# Patient Record
Sex: MALE | Race: WHITE | Employment: FULL TIME | URBAN - METROPOLITAN AREA
[De-identification: names, ages, dates, MRNs, and addresses within clinical notes are randomized per-mention and may not be internally consistent; named-entity substitution may affect disease eponyms.]

---

## 2021-11-11 ENCOUNTER — TRANSCRIBE ORDER (OUTPATIENT)
Dept: SCHEDULING | Age: 31
End: 2021-11-11

## 2021-11-11 DIAGNOSIS — S46.011A STRAIN OF TENDON OF RIGHT ROTATOR CUFF: Primary | ICD-10-CM

## 2021-11-12 ENCOUNTER — HOSPITAL ENCOUNTER (OUTPATIENT)
Dept: MRI IMAGING | Age: 31
Discharge: HOME | End: 2021-11-12
Attending: ORTHOPAEDIC SURGERY
Payer: OTHER MISCELLANEOUS

## 2021-11-12 DIAGNOSIS — S46.011A STRAIN OF TENDON OF RIGHT ROTATOR CUFF: ICD-10-CM

## 2021-11-12 PROCEDURE — 73221 MRI JOINT UPR EXTREM W/O DYE: CPT

## 2022-04-14 ENCOUNTER — VIRTUAL VISIT (OUTPATIENT)
Dept: INTERNAL MEDICINE CLINIC | Age: 32
End: 2022-04-14
Payer: COMMERCIAL

## 2022-04-14 DIAGNOSIS — K21.9 GASTROESOPHAGEAL REFLUX DISEASE WITHOUT ESOPHAGITIS: ICD-10-CM

## 2022-04-14 DIAGNOSIS — J30.89 NON-SEASONAL ALLERGIC RHINITIS, UNSPECIFIED TRIGGER: ICD-10-CM

## 2022-04-14 DIAGNOSIS — Z83.3 FAMILY HISTORY OF DIABETES MELLITUS: ICD-10-CM

## 2022-04-14 DIAGNOSIS — Z00.00 ENCOUNTER FOR MEDICAL EXAMINATION TO ESTABLISH CARE: Primary | ICD-10-CM

## 2022-04-14 DIAGNOSIS — E66.01 MORBID OBESITY (HCC): ICD-10-CM

## 2022-04-14 DIAGNOSIS — J45.20 MILD INTERMITTENT ASTHMA WITHOUT COMPLICATION: ICD-10-CM

## 2022-04-14 DIAGNOSIS — R53.82 CHRONIC FATIGUE: ICD-10-CM

## 2022-04-14 DIAGNOSIS — I67.1 CEREBRAL ANEURYSM: ICD-10-CM

## 2022-04-14 DIAGNOSIS — R06.83 SNORING: ICD-10-CM

## 2022-04-14 DIAGNOSIS — K44.9 HIATAL HERNIA: ICD-10-CM

## 2022-04-14 PROCEDURE — 99205 OFFICE O/P NEW HI 60 MIN: CPT | Performed by: PHYSICIAN ASSISTANT

## 2022-04-14 RX ORDER — PHENOL/SODIUM PHENOLATE
20 AEROSOL, SPRAY (ML) MUCOUS MEMBRANE DAILY
Qty: 90 TABLET | Refills: 3 | Status: SHIPPED | OUTPATIENT
Start: 2022-04-14

## 2022-04-14 RX ORDER — MONTELUKAST SODIUM 10 MG/1
10 TABLET ORAL DAILY
Qty: 30 TABLET | Refills: 5 | Status: SHIPPED | OUTPATIENT
Start: 2022-04-14 | End: 2022-10-31 | Stop reason: SDUPTHER

## 2022-04-14 RX ORDER — ALBUTEROL SULFATE 90 UG/1
1-2 AEROSOL, METERED RESPIRATORY (INHALATION)
COMMUNITY
Start: 2021-05-03 | End: 2022-04-14 | Stop reason: SDUPTHER

## 2022-04-14 RX ORDER — FEXOFENADINE HCL 60 MG
60 TABLET ORAL DAILY
Qty: 90 TABLET | Refills: 3 | Status: SHIPPED | OUTPATIENT
Start: 2022-04-14

## 2022-04-14 RX ORDER — METFORMIN HYDROCHLORIDE 500 MG/1
500 TABLET ORAL
Qty: 30 TABLET | Refills: 5 | Status: SHIPPED | OUTPATIENT
Start: 2022-04-14 | End: 2022-09-15

## 2022-04-14 RX ORDER — BISMUTH SUBSALICYLATE 262 MG
1 TABLET,CHEWABLE ORAL DAILY
COMMUNITY

## 2022-04-14 RX ORDER — ALBUTEROL SULFATE 90 UG/1
1-2 AEROSOL, METERED RESPIRATORY (INHALATION)
Qty: 18 G | Refills: 0 | Status: SHIPPED | OUTPATIENT
Start: 2022-04-14 | End: 2022-07-13

## 2022-04-14 RX ORDER — FEXOFENADINE HCL 60 MG
TABLET ORAL
COMMUNITY
End: 2022-04-14 | Stop reason: SDUPTHER

## 2022-04-14 RX ORDER — FEXOFENADINE HCL AND PSEUDOEPHEDRINE HCI 60; 120 MG/1; MG/1
1 TABLET, EXTENDED RELEASE ORAL EVERY 12 HOURS
COMMUNITY
End: 2022-04-14

## 2022-04-14 RX ORDER — FLUTICASONE PROPIONATE 50 MCG
2 SPRAY, SUSPENSION (ML) NASAL DAILY
COMMUNITY

## 2022-04-14 RX ORDER — PHENOL/SODIUM PHENOLATE
20 AEROSOL, SPRAY (ML) MUCOUS MEMBRANE DAILY
COMMUNITY
End: 2022-04-14 | Stop reason: SDUPTHER

## 2022-04-14 RX ORDER — TOPIRAMATE 25 MG/1
25 TABLET ORAL 2 TIMES DAILY
Qty: 60 TABLET | Refills: 5 | Status: SHIPPED | OUTPATIENT
Start: 2022-04-14 | End: 2022-04-29 | Stop reason: DRUGHIGH

## 2022-04-14 NOTE — PATIENT INSTRUCTIONS
Topiramate (By mouth)   Topiramate (toe-PIR-a-mate)  Treats and prevents seizures, and helps prevent migraine headaches. Brand Name(s): Qudexy XR, Topamax, Trokendi XR   There may be other brand names for this medicine. When This Medicine Should Not Be Used: This medicine is not right for everyone. Do not use it if you had an allergic reaction to topiramate, or if you are pregnant. How to Use This Medicine:   Capsule, Long Acting Capsule, Tablet  · Take your medicine as directed. Your dose may need to be changed several times to find what works best for you. · Tablet: Swallow whole. Do not break, crush, or chew the tablet. It has a very bitter taste. · Capsule or extended-release capsule: Do not crush or chew the capsule. Swallow whole or open the capsule and pour the medicine into a small amount (1 teaspoon) of soft food, such as applesauce. Swallow the mixture right away without chewing. Do not store the mixture for use at a later time. · Drink extra fluids so you will urinate more often and help prevent kidney problems. · This medicine should come with a Medication Guide. Ask your pharmacist for a copy if you do not have one. · Missed dose: Take a dose as soon as you remember. If it is almost time for your next dose, wait until then and take a regular dose. Do not take extra medicine to make up for a missed dose. If you miss a dose or forget to use your medicine, use it as soon as you can. If your next regular dose of Topamax® is less than 6 hours away, wait until then to use the medicine and skip the missed dose. If you miss more than 1 dose of Topamax®, call your doctor for instructions. · Store the medicine in a closed container at room temperature, away from heat, moisture, and direct light. Drugs and Foods to Avoid:   Ask your doctor or pharmacist before using any other medicine, including over-the-counter medicines, vitamins, and herbal products.   · Do not drink alcohol with Qudexy XR or Topamax®. Do not drink alcohol for 6 hours before and 6 hours after you take the Trokendi XR capsule. · Some medicines can affect how topiramate works. Tell your doctor if you are using acetazolamide, dichlorphenamide, dichloralphenazone, digoxin, lithium, metformin, zonisamide, other medicine for seizures (such as carbamazepine, phenytoin, valproic acid), or birth control pills. · Tell your doctor if you are using any medicine that makes you sleepy, such as allergy medicine or narcotic pain medicine. Warnings While Using This Medicine:   · It is not safe to take this medicine during pregnancy. It could harm an unborn baby. Tell your doctor right away if you become pregnant. · Tell your doctor if you are breastfeeding, or if you have kidney disease, liver disease, glaucoma, lung or breathing problems, osteoporosis, or a history of depression or mood disorders. Tell your doctor if you are on a ketogenic diet (high in fat and low in carbohydrates). · This medicine may cause the following problems:  ¨ Eye pain or vision changes, including glaucoma  ¨ Changes in body temperature  ¨ Metabolic acidosis (too much acid in the blood)  ¨ Kidney stones  · This medicine may increase depression or thoughts of suicide. Tell your doctor right away if you start to feel more depressed or think about hurting yourself. · This medicine may make you dizzy, drowsy, or tired. Do not drive or do anything else that could be dangerous until you know how this medicine affects you. · Do not stop using this medicine suddenly. Your doctor will need to slowly decrease your dose before you stop it completely. · Your doctor will do lab tests at regular visits to check on the effects of this medicine. Keep all appointments. · Keep all medicine out of the reach of children. Never share your medicine with anyone.   Possible Side Effects While Using This Medicine:   Call your doctor right away if you notice any of these side effects:  · Allergic reaction: Itching or hives, swelling in your face or hands, swelling or tingling in your mouth or throat, chest tightness, trouble breathing  · Bloody or cloudy urine, painful urination, sudden lower back or stomach pain  · Changes in vision, eye pain  · Confusion, problems with walking, clumsiness, dizziness, or trouble talking, concentrating, or remembering  · Feeling agitated, depressed, nervous, or irritable, thoughts of hurting yourself or others, unusual mood or behavior  · Fever, decreased sweating  · Numbness, tingling, or burning pain in your hands, arms, legs, or feet  · Rapid, deep breathing, loss of appetite, fast or uneven heartbeat  · Vomiting, unusual drowsiness, tiredness, or weakness  If you notice these less serious side effects, talk with your doctor:   · Change in taste  · Nausea, diarrhea  · Stuffy or runny nose  · Weight loss  If you notice other side effects that you think are caused by this medicine, tell your doctor. Call your doctor for medical advice about side effects. You may report side effects to FDA at 5-126-FDA-7022  © 2017 [facility] Information is for End User's use only and may not be sold, redistributed or otherwise used for commercial purposes. The above information is an  only. It is not intended as medical advice for individual conditions or treatments. Talk to your doctor, nurse or pharmacist before following any medical regimen to see if it is safe and effective for you. Metformin (By mouth)   Metformin Hydrochloride (met-FOR-min eugenie-droe-KLOR-milton)  Treats type 2 diabetes. Brand Name(s): DM2, Fortamet, Glucophage, Glucophage XR, Glumetza, Riomet   There may be other brand names for this medicine. When This Medicine Should Not Be Used: This medicine is not right for everyone. Do not use if you had an allergic reaction to metformin.   How to Use This Medicine:   Liquid, Tablet, Long Acting Tablet  · Take your medicine as directed. Your dose may need to be changed several times to find what works best for you. · It is best to take this medicine with food or milk. · Swallow the extended-release tablet whole. Do not crush, break, or chew it. Tell your doctor if you have trouble swallowing the tablets whole. · Measure the oral liquid medicine with a marked measuring spoon, oral syringe, or medicine cup. · Read and follow the patient instructions that come with this medicine. Talk to your doctor or pharmacist if you have any questions. · Missed dose: Take a dose as soon as you remember. If it is almost time for your next dose, wait until then and take a regular dose. Do not take extra medicine to make up for a missed dose. · Store the medicine in a closed container at room temperature, away from heat, moisture, and direct light. Drugs and Foods to Avoid:   Ask your doctor or pharmacist before using any other medicine, including over-the-counter medicines, vitamins, and herbal products. · Some medicines can affect how metformin works. Tell your doctor if you are using any of the following:  ¨ Acetazolamide, dichlorphenamide, dolutegravir, isoniazid, nicotinic acid, phenytoin, ranolazine, topiramate, vandetanib, zonisamide  ¨ Birth control pills  ¨ Blood pressure medicine  ¨ Diuretic (water pill)  ¨ Phenothiazine medicine  ¨ Steroid medicine  ¨ Thyroid medicine  · Do not drink alcohol while you are using this medicine. Warnings While Using This Medicine:   · Tell your doctor if you are pregnant or breastfeeding, or if you have kidney disease, liver disease, heart or blood vessel disease, heart failure, blood circulation problems, anemia, metabolic acidosis, an adrenal gland or pituitary gland disorder, vitamin B12 deficiency, or had a heart attack. Tell your doctor if you drink alcohol. · Too much of this medicine can cause a rare, but serious condition called lactic acidosis.   · Part of the extended-release tablet may pass in your stool. This is normal.  · Make sure any doctor or dentist who treats you knows that you are using this medicine. You may need to stop using this medicine before you have surgery, an x-ray, CT scan, or other medical test.  · Your doctor will do lab tests at regular visits to check on the effects of this medicine. Keep all appointments. · Keep all medicine out of the reach of children. Never share your medicine with anyone. Possible Side Effects While Using This Medicine:   Call your doctor right away if you notice any of these side effects:  · Allergic reaction: Itching or hives, swelling in your face or hands, swelling or tingling in your mouth or throat, chest tightness, trouble breathing  · Confusion, fast heartbeat, increased hunger, shakiness  · Fever or chills  · Stomach pain, nausea, vomiting, muscle pain or cramping  · Trouble breathing, slow heartbeat, lightheadedness, dizziness  · Unusual tiredness or weakness  If you notice these less serious side effects, talk with your doctor:   · Diarrhea, gas  If you notice other side effects that you think are caused by this medicine, tell your doctor. Call your doctor for medical advice about side effects. You may report side effects to FDA at 7-300-FDA-3223  © 2017 [facility] Information is for End User's use only and may not be sold, redistributed or otherwise used for commercial purposes. The above information is an  only. It is not intended as medical advice for individual conditions or treatments. Talk to your doctor, nurse or pharmacist before following any medical regimen to see if it is safe and effective for you. Montelukast (By mouth)   Montelukast (uoh-pw-OUQ-kast)  Prevents asthma attacks and treats allergies. Brand Name(s): Singulair   There may be other brand names for this medicine. When This Medicine Should Not Be Used: This medicine is not right for everyone.  Do not use it if you had an allergic reaction to montelukast.  How to Use This Medicine:   Packet, Tablet, Chewable Tablet  · Your doctor will tell you how much medicine to use. Do not use more than directed. · Read and follow the patient instructions that come with this medicine. Talk to your doctor or pharmacist if you have any questions. · Oral granules: Do not open the packet until you are ready to use it. You can give the oral granules in one of three ways. ¨ Put the oral granules directly on a spoon, then into the person's mouth. ¨ Mix the granules with baby formula or breast milk that is cold or room temperature. Do not mix the granules with any other liquid. ¨ Mix the granules with applesauce, mashed carrots, rice, or ice cream. Do not mix the granules with any other type of soft food. ¨ If the medicine is mixed with formula, breast milk, or food, use it right away. Do not save any mixed medicine to use later. · Missed dose: Take a dose as soon as you remember. If it is almost time for your next dose, wait until then and take a regular dose. Do not take extra medicine to make up for a missed dose. · Store the medicine in the original container at room temperature, away from heat and direct light. Drugs and Foods to Avoid:      Ask your doctor or pharmacist before using any other medicine, including over-the-counter medicines, vitamins, and herbal products. Warnings While Using This Medicine:   · Tell your doctor if you are pregnant or breastfeeding, or if you have liver disease, have a condition called phenylketonuria (PKU), or are allergic to aspirin. · This medicine will not stop an asthma attack that has already started. Your doctor may prescribe another medicine for a sudden asthma attack. · If you use a corticosteroid medicine to control your asthma, keep using it as instructed by your doctor. · If any of your asthma medicines do not seem to be working as well as usual, call your doctor right away.  Do not change your doses or stop using your medicines without asking your doctor. · This medicine may cause some people to be agitated, disoriented, irritable, or reckless. It may also cause depression or suicidal thoughts. Tell your doctor if you have any unusual thoughts or behaviors that trouble you. · This medicine may make you dizzy or drowsy. Do not drive or do anything else that could be dangerous until you know how this medicine affects you. · Keep all medicine out of the reach of children. Never share your medicine with anyone. Possible Side Effects While Using This Medicine:   Call your doctor right away if you notice any of these side effects:  · Allergic reaction: Itching or hives, swelling in your face or hands, swelling or tingling in your mouth or throat, chest tightness, trouble breathing  · Blistering, peeling, red skin rash  · Chest pain, or a fast, pounding, or uneven heartbeat  · Numbness or tingling in the hands, arms, legs, or feet  · Pain and swelling in your sinuses  · Restlessness, anxiety, irritability, mood or behavior changes, or thoughts of hurting yourself or others  · Sudden and severe stomach pain, nausea, vomiting, fever, lightheadedness  · Unusual bleeding or bruising  If you notice these less serious side effects, talk with your doctor:   · Cough, sore throat, or flu symptoms  · Headache  If you notice other side effects that you think are caused by this medicine, tell your doctor. Call your doctor for medical advice about side effects. You may report side effects to FDA at 3-485-FDA-2079  © 2017 [facility] Information is for End User's use only and may not be sold, redistributed or otherwise used for commercial purposes. The above information is an  only. It is not intended as medical advice for individual conditions or treatments. Talk to your doctor, nurse or pharmacist before following any medical regimen to see if it is safe and effective for you.

## 2022-04-14 NOTE — PROGRESS NOTES
Khloe Guerin is a 27 y.o. male who was seen by synchronous (real-time) audio-video technology on 4/14/2022 for Establish Care (previous pcp - none // pain - 0 ), Weight Loss, Cerebral Aneurysm (has one in 2017 // ), and Labs (family history of diabetes // transitioning and stopped in 2018 // testerone levels )        Assessment & Plan:   Diagnoses and all orders for this visit:    1. Encounter for medical examination to establish care  -     CBC WITH AUTOMATED DIFF; Future  -     METABOLIC PANEL, COMPREHENSIVE; Future  -     LIPID PANEL; Future  -     URINALYSIS W/MICROSCOPIC; Future  -     VITAMIN D, 25 HYDROXY; Future    2. Morbid obesity (HCC)  -     topiramate (TOPAMAX) 25 mg tablet; Take 1 Tablet by mouth two (2) times a day. -     metFORMIN (GLUCOPHAGE) 500 mg tablet; Take 1 Tablet by mouth daily (with dinner). -     HEMOGLOBIN A1C WITH EAG; Future  -     TSH 3RD GENERATION; Future  Will begin daily metofrmin and BID topamax for weight loss. Will discuss further in office    3. Cerebral aneurysm  -     REFERRAL TO NEUROLOGY  With bleed and sp coiling in 2017 in [place]. Needs neurosurgeon for yearly screening . Return to office to check blood pressure and discussed BP control  4. Non-seasonal allergic rhinitis, unspecified trigger  -     fexofenadine (ALLEGRA) 60 mg tablet; Take 1 Tablet by mouth daily. -     montelukast (SINGULAIR) 10 mg tablet; Take 1 Tablet by mouth daily.  -     REFERRAL TO ENT-OTOLARYNGOLOGY  Begin daily Singulair. Send to ENT for further pricila and possibly allergist   5. Chronic fatigue  -     TESTOSTERONE, FREE & TOTAL; Future  -     VITAMIN D, 25 HYDROXY; Future  Check T levels. Curious about T levels given hx of testosterone blocker use with transition  6. Snoring  -     SLEEP MEDICINE REFERRAL  -     REFERRAL TO ENT-OTOLARYNGOLOGY  Possibly related to fatigue. Send for sleep study  7. Family history of diabetes mellitus  Check CMP/A1C  8.  Gastroesophageal reflux disease without esophagitis  -     Omeprazole delayed release (PRILOSEC D/R) 20 mg tablet; Take 1 Tablet by mouth daily.  -     REFERRAL TO GASTROENTEROLOGY  See below  9. Hiatal hernia  -     Omeprazole delayed release (PRILOSEC D/R) 20 mg tablet; Take 1 Tablet by mouth daily.  -     REFERRAL TO GASTROENTEROLOGY  Pt reported, refill prilosec, send to GI  10. Mild intermittent asthma without complication  -     albuterol (PROVENTIL HFA, VENTOLIN HFA, PROAIR HFA) 90 mcg/actuation inhaler; Take 1-2 Puffs by inhalation every four (4) hours as needed for Wheezing for up to 90 days. -     montelukast (SINGULAIR) 10 mg tablet; Take 1 Tablet by mouth daily. Described some chest tightness related at times related to allergens and \"wine that had been barrel aged. 11. Orgasmic dysfunction  -     TESTOSTERONE, FREE & TOTAL; Future  -     PSA SCREENING (SCREENING); Future  Reports since testosterone blocker has had decreased orgasm. Still ejaculates but significant decrease in orgasm. The complexity of medical decision making for this visit is high     I spent at least 60 minutes on this visit with this new patient. 712  Subjective:   Patient seen via mychart virtual visit to establish care. PMHx:   Tobacco user: 1 ppd, has tried to quit. Causes stress and increased BP. Cerebral Aneurysm. In 2017, had aneurysm with bleeding while living in [place]. Had to be transported [facility] and had neurosurgery coil aneurysm and stop bleeding. Followed up few years later in [place] and had conficting reports whether new aneurysm was present. Had establishd with [facility] neurosurgery and had CTA done which was okay. Did not do well with Neurosurgeon at [facility]. Unsure of blood pressures at this time. Hx of GERD: Had EGD done in [place] showed Hiatal hernia. Uses 20 mg prilosec regularly. Had some epigastric pain recently, non-recurrence since. Hx of year round seasonal allergies.  Gets chronic sinusitis issues when bad. Has been using Allegra and Flonase. Has not seen ENT. Notes occasionally gets vertigo like dizziness when bad and has sinus pressure which causes pain in teeth and behind eyes. Hx of transition male to female. Was using female hormones and testosterone blockers. Decided to stop after aneurysm because of risk of blood clots with female hormones. Is okay with this decision at this time. No hx of mental health, depression or anxiety. Family hx of DM2, HTN. Prior to Admission medications    Medication Sig Start Date End Date Taking? Authorizing Provider   multivitamin (ONE A DAY) tablet Take 1 Tablet by mouth daily. Yes Provider, Historical   Lactobacillus acidophilus (PROBIOTIC PO) Take  by mouth. Yes Provider, Historical   fluticasone propionate (Flonase Allergy Relief) 50 mcg/actuation nasal spray 2 Sprays by Both Nostrils route daily. Yes Provider, Historical   albuterol (PROVENTIL HFA, VENTOLIN HFA, PROAIR HFA) 90 mcg/actuation inhaler Take 1-2 Puffs by inhalation every four (4) hours as needed for Wheezing for up to 90 days. 4/14/22 7/13/22 Yes Miryam Fisher PA-C   fexofenadine (ALLEGRA) 60 mg tablet Take 1 Tablet by mouth daily. 4/14/22  Yes Miryam Fisher PA-C   montelukast (SINGULAIR) 10 mg tablet Take 1 Tablet by mouth daily. 4/14/22  Yes Miryam Fisher PA-C   Omeprazole delayed release (PRILOSEC D/R) 20 mg tablet Take 1 Tablet by mouth daily. 4/14/22  Yes Miryam Fisher PA-C   topiramate (TOPAMAX) 25 mg tablet Take 1 Tablet by mouth two (2) times a day. 4/14/22  Yes Miryam Fisher PA-C   metFORMIN (GLUCOPHAGE) 500 mg tablet Take 1 Tablet by mouth daily (with dinner). 4/14/22  Yes Miryam Fisher PA-C   Omeprazole delayed release (PRILOSEC D/R) 20 mg tablet Take 20 mg by mouth daily. 4/14/22  Provider, Historical   fexofenadine-pseudoephedrine (Allegra-D 12 Hour)  mg Tb12 Take 1 Tablet by mouth every twelve (12) hours.   Patient not taking: Reported on 4/14/2022 4/14/22  Provider, Historical   albuterol (PROVENTIL HFA, VENTOLIN HFA, PROAIR HFA) 90 mcg/actuation inhaler Take 1-2 Puffs by inhalation. 5/3/21 4/14/22  Provider, Historical   fexofenadine (ALLEGRA) 60 mg tablet   4/14/22  Provider, Historical     Patient Active Problem List   Diagnosis Code    Non-seasonal allergic rhinitis J30.89    Cerebral aneurysm I67.1    Morbid obesity ([facility] [address] E66.01    Snoring R06.83    Family history of diabetes mellitus Z83.3    Orgasmic dysfunction F52.31    Mild intermittent asthma without complication K62.76    Hiatal hernia K44.9    Gastroesophageal reflux disease without esophagitis K21.9     Patient Active Problem List    Diagnosis Date Noted    Non-seasonal allergic rhinitis 04/14/2022    Cerebral aneurysm 04/14/2022    Morbid obesity ([facility] [address] 04/14/2022    Snoring 04/14/2022    Family history of diabetes mellitus 04/14/2022    Orgasmic dysfunction 04/14/2022    Mild intermittent asthma without complication 64/74/2361    Hiatal hernia 04/14/2022    Gastroesophageal reflux disease without esophagitis 04/14/2022     Current Outpatient Medications   Medication Sig Dispense Refill    multivitamin (ONE A DAY) tablet Take 1 Tablet by mouth daily.  Lactobacillus acidophilus (PROBIOTIC PO) Take  by mouth.  fluticasone propionate (Flonase Allergy Relief) 50 mcg/actuation nasal spray 2 Sprays by Both Nostrils route daily.  albuterol (PROVENTIL HFA, VENTOLIN HFA, PROAIR HFA) 90 mcg/actuation inhaler Take 1-2 Puffs by inhalation every four (4) hours as needed for Wheezing for up to 90 days. 18 g 0    fexofenadine (ALLEGRA) 60 mg tablet Take 1 Tablet by mouth daily. 90 Tablet 3    montelukast (SINGULAIR) 10 mg tablet Take 1 Tablet by mouth daily. 30 Tablet 5    Omeprazole delayed release (PRILOSEC D/R) 20 mg tablet Take 1 Tablet by mouth daily. 90 Tablet 3    topiramate (TOPAMAX) 25 mg tablet Take 1 Tablet by mouth two (2) times a day.  60 Tablet 5  metFORMIN (GLUCOPHAGE) 500 mg tablet Take 1 Tablet by mouth daily (with dinner). 30 Tablet 5     No Known Allergies  Past Medical History:   Diagnosis Date    Brain aneurysm 2017     Past Surgical History:   Procedure Laterality Date    HX OTHER SURGICAL  2017    surgery for brain aneurysm     Family History   Problem Relation Age of Onset    Diabetes Mother     Hypertension Mother    [facility] Elevated Lipids Mother      Social History     Tobacco Use    Smoking status: Current Every Day Smoker     Packs/day: 1.00     Types: Cigarettes    Smokeless tobacco: Never Used   Substance Use Topics    Alcohol use: Yes     Comment: occasional        Review of Systems   Constitutional: Positive for malaise/fatigue. Negative for chills, fever and weight loss. HENT: Negative for congestion and hearing loss. Eyes: Negative. Respiratory: Negative for cough, shortness of breath and wheezing. Cardiovascular: Negative for chest pain, palpitations and leg swelling. Gastrointestinal: Positive for heartburn. Negative for abdominal pain, blood in stool, constipation, diarrhea, melena, nausea and vomiting. Genitourinary: Negative for dysuria and frequency. Musculoskeletal: Negative for myalgias. Skin: Negative for rash. Neurological: Negative for dizziness, weakness and headaches. Endo/Heme/Allergies: Positive for environmental allergies. Does not bruise/bleed easily. Psychiatric/Behavioral: Negative for depression and substance abuse. The patient is not nervous/anxious. All other systems reviewed and are negative.       Objective:     Patient-Reported Vitals 4/14/2022   Patient-Reported Weight 300lb      General: alert, cooperative, no distress   Mental  status: normal mood, behavior, speech, dress, motor activity, and thought processes, able to follow commands   HENT: NCAT   Neck: no visualized mass   Resp: no respiratory distress   Neuro: no gross deficits   Skin: no discoloration or lesions of concern on visible areas   Psychiatric: normal affect, consistent with stated mood, no evidence of hallucinations     Additional exam findings: We discussed the expected course, resolution and complications of the diagnosis(es) in detail. Medication risks, benefits, costs, interactions, and alternatives were discussed as indicated. I advised him to contact the office if his condition worsens, changes or fails to improve as anticipated. He expressed understanding with the diagnosis(es) and plan. Osiris Guo, was evaluated through a synchronous (real-time) audio-video encounter. The patient (or guardian if applicable) is aware that this is a billable service, which includes applicable co-pays. Verbal consent to proceed has been obtained. The visit was conducted pursuant to the emergency declaration under the [address] [place] [address], [address] [place] [address] authority and the [employer] General Act. Patient identification was verified, and a caregiver was present when appropriate. The patient was located at home in a state where the provider was licensed to provide care.     Amaya Okeefe PA-C

## 2022-04-14 NOTE — PROGRESS NOTES
Cesar Done  Identified pt with two pt identifiers(name and ). Chief Complaint   Patient presents with   [address]     previous pcp - none // pain - 0     Weight Loss    Cerebral Aneurysm     has one in 2017 //    [place]     family history of diabetes // transitioning and stopped in 2018 // testerone levels        Reviewed record In preparation for visit and have obtained necessary documentation. 1. Have you been to the ER, urgent care clinic or hospitalized since your last visit? No    2. Have you seen or consulted any other health care providers outside of the [address] [place] [address] since your last visit? Include any pap smears or colon screening. Yes. Eye Exam - Phu Garcia in [facility]     Patient does not have an advance directive. Vitals reviewed with provider. Health Maintenance reviewed:     Health Maintenance Due   Topic    Hepatitis C Screening     Depression Screen     COVID-19 Vaccine (3 - Booster for Pfizer series)          Wt Readings from Last 3 Encounters:   No data found for Wt        Temp Readings from Last 3 Encounters:   No data found for Temp        BP Readings from Last 3 Encounters:   No data found for BP        Pulse Readings from Last 3 Encounters:   No data found for Pulse      There were no vitals filed for this visit. Learning Assessment:   :       Learning Assessment 2022   PRIMARY LEARNER Patient   HIGHEST LEVEL OF EDUCATION - PRIMARY LEARNER  GRADUATED HIGH SCHOOL OR GED   BARRIERS PRIMARY LEARNER NONE   PRIMARY LANGUAGE ENGLISH   LEARNER PREFERENCE PRIMARY DEMONSTRATION   ANSWERED BY Patient   RELATIONSHIP SELF        Depression Screening:   :       3 most recent PHQ Screens 2022   Little interest or pleasure in doing things Not at all   Feeling down, depressed, irritable, or hopeless Not at all   Total Score PHQ 2 0        Fall Risk Assessment:   :     No flowsheet data found. Abuse Screening:   :     No flowsheet data found. ADL Screening:   :       ADL Assessment 4/14/2022   Feeding yourself No Help Needed   Getting from bed to chair No Help Needed   Getting dressed No Help Needed   Bathing or showering No Help Needed   Walk across the room (includes cane/walker) No Help Needed   Using the telphone No Help Needed   Taking your medications No Help Needed   Preparing meals No Help Needed   Managing money (expenses/bills) No Help Needed   Moderately strenuous housework (laundry) No Help Needed   Shopping for personal items (toiletries/medicines) No Help Needed   Shopping for groceries No Help Needed   Driving No Help Needed   Climbing a flight of stairs No Help Needed   Getting to places beyond walking distances No Help Needed

## 2022-04-18 ENCOUNTER — VIRTUAL VISIT (OUTPATIENT)
Dept: INTERNAL MEDICINE CLINIC | Age: 32
End: 2022-04-18
Payer: COMMERCIAL

## 2022-04-18 DIAGNOSIS — R79.89 LOW TESTOSTERONE IN MALE: Primary | ICD-10-CM

## 2022-04-18 DIAGNOSIS — N30.01 ACUTE CYSTITIS WITH HEMATURIA: Primary | ICD-10-CM

## 2022-04-18 DIAGNOSIS — R35.0 URINARY FREQUENCY: ICD-10-CM

## 2022-04-18 LAB
BILIRUB UR QL STRIP: NEGATIVE
GLUCOSE UR-MCNC: NEGATIVE MG/DL
KETONES P FAST UR STRIP-MCNC: NEGATIVE MG/DL
PH UR STRIP: 7 [PH] (ref 4.6–8)
PROT UR QL STRIP: NORMAL
SP GR UR STRIP: 1.02 (ref 1–1.03)
UA UROBILINOGEN AMB POC: NORMAL (ref 0.2–1)
URINALYSIS CLARITY POC: NORMAL
URINALYSIS COLOR POC: NORMAL
URINE BLOOD POC: NORMAL
URINE LEUKOCYTES POC: NORMAL
URINE NITRITES POC: NEGATIVE

## 2022-04-18 PROCEDURE — 81003 URINALYSIS AUTO W/O SCOPE: CPT | Performed by: INTERNAL MEDICINE

## 2022-04-18 PROCEDURE — 99213 OFFICE O/P EST LOW 20 MIN: CPT | Performed by: INTERNAL MEDICINE

## 2022-04-18 RX ORDER — NITROFURANTOIN 25; 75 MG/1; MG/1
100 CAPSULE ORAL 2 TIMES DAILY
Qty: 14 CAPSULE | Refills: 0 | Status: SHIPPED | OUTPATIENT
Start: 2022-04-18 | End: 2022-04-25

## 2022-04-18 NOTE — PROGRESS NOTES
Baron Reeves  Identified pt with two pt identifiers(name and ). Chief Complaint   Patient presents with    Bladder Infection     urinary frequency // red in color // pain at the end of urination //        Reviewed record In preparation for visit and have obtained necessary documentation. 1. Have you been to the ER, urgent care clinic or hospitalized since your last visit? No     2. Have you seen or consulted any other health care providers outside of the [address] [place] [address] since your last visit? Include any pap smears or colon screening. No    Patient does not have an advance directive. Vitals reviewed with provider. Health Maintenance reviewed:     Health Maintenance Due   Topic    Hepatitis C Screening     Pneumococcal 0-64 years (1 - PCV)    COVID-19 Vaccine (3 - Booster for [employer]oe[employer] series)        Wt Readings from Last 3 Encounters:   No data found for Wt      Temp Readings from Last 3 Encounters:   No data found for Temp      BP Readings from Last 3 Encounters:   No data found for BP      Pulse Readings from Last 3 Encounters:   No data found for Pulse      There were no vitals filed for this visit. Learning Assessment:   :     Learning Assessment 2022   PRIMARY LEARNER Patient   HIGHEST LEVEL OF EDUCATION - PRIMARY LEARNER  GRADUATED HIGH SCHOOL OR GED   BARRIERS PRIMARY LEARNER NONE   PRIMARY LANGUAGE ENGLISH   LEARNER PREFERENCE PRIMARY DEMONSTRATION   ANSWERED BY Patient   RELATIONSHIP SELF        Depression Screening:   :     3 most recent PHQ Screens 2022   Little interest or pleasure in doing things Not at all   Feeling down, depressed, irritable, or hopeless Not at all   Total Score PHQ 2 0        Fall Risk Assessment:   :     No flowsheet data found. Abuse Screening:   :     No flowsheet data found.      ADL Screening:   :     ADL Assessment 2022   Feeding yourself No Help Needed   Getting from bed to chair No Help Needed   Getting dressed No Help Needed   Bathing or showering No Help Needed   Walk across the room (includes cane/walker) No Help Needed   Using the telphone No Help Needed   Taking your medications No Help Needed   Preparing meals No Help Needed   Managing money (expenses/bills) No Help Needed   Moderately strenuous housework (laundry) No Help Needed   Shopping for personal items (toiletries/medicines) No Help Needed   Shopping for groceries No Help Needed   Driving No Help Needed   Climbing a flight of stairs No Help Needed   Getting to places beyond walking distances No Help Needed

## 2022-04-18 NOTE — PROGRESS NOTES
Osiris Guo is a 27 y.o. male who was seen by synchronous (real-time) audio-video technology on 4/18/2022 for Bladder Infection (urinary frequency // red in color // pain at the end of urination // )        Assessment & Plan:     Diagnoses and all orders for this visit:    1. Acute cystitis with hematuria  -     Start nitrofurantoin, macrocrystal-monohydrate, (MACROBID) 100 mg capsule; Take 1 Capsule by mouth two (2) times a day for 7 days. -     CULTURE, URINE; Future    2. Urinary frequency  Trace LE, 1+ blood, neg nitrites. -     AMB POC URINALYSIS DIP STICK AUTO W/O MICRO      Follow-up and Dispositions    · Return if symptoms worsen or fail to improve, for Scheduled appointment with Amaya Okeefe (PCP) on 5/11/22.           712  Subjective:     Complains of pain with urination and urinary frequency that started 2 days ago. Seen at [facility] ED on 3/5/22 for UTI and probable STD. Treated with ceftriaxone IM, doxycycline x 10 days (he reports he did not finish the full 10 days), and Pyridium. Urine culture positive for E. Coli sensitive to Ceftriaxone, cefazolin, and nitrofurantoin, resistant to ampicillin and Bactrim. Denies fevers, chills, suprapubic pain, or penile discharge. Prior to Admission medications    Medication Sig Start Date End Date Taking? Authorizing Provider   multivitamin (ONE A DAY) tablet Take 1 Tablet by mouth daily. Yes Provider, Historical   Lactobacillus acidophilus (PROBIOTIC PO) Take  by mouth. Yes Provider, Historical   fluticasone propionate (Flonase Allergy Relief) 50 mcg/actuation nasal spray 2 Sprays by Both Nostrils route daily. Yes Provider, Historical   albuterol (PROVENTIL HFA, VENTOLIN HFA, PROAIR HFA) 90 mcg/actuation inhaler Take 1-2 Puffs by inhalation every four (4) hours as needed for Wheezing for up to 90 days. 4/14/22 7/13/22 Yes Don Romero PA-C   fexofenadine (ALLEGRA) 60 mg tablet Take 1 Tablet by mouth daily.  4/14/22  Yes Don Romero PA-C montelukast (SINGULAIR) 10 mg tablet Take 1 Tablet by mouth daily. 4/14/22  Yes Katha Reasons, PA-C   Omeprazole delayed release (PRILOSEC D/R) 20 mg tablet Take 1 Tablet by mouth daily. 4/14/22  Yes Katha Reasons, PA-C   topiramate (TOPAMAX) 25 mg tablet Take 1 Tablet by mouth two (2) times a day. 4/14/22  Yes Katha Reasons, PA-C   metFORMIN (GLUCOPHAGE) 500 mg tablet Take 1 Tablet by mouth daily (with dinner).  4/14/22  Yes Katha Reasons, PA-C     Patient Active Problem List   Diagnosis Code    Non-seasonal allergic rhinitis J30.89    Cerebral aneurysm I67.1    Morbid obesity ([facility] [address] E66.01    Snoring R06.83    Family history of diabetes mellitus Z83.3    Orgasmic dysfunction F52.31    Mild intermittent asthma without complication G35.80    Hiatal hernia K44.9    Gastroesophageal reflux disease without esophagitis K21.9       Objective:     Patient-Reported Vitals 4/18/2022   Patient-Reported Weight 300lb      General: alert, cooperative, no distress   Mental  status: normal mood, behavior, speech, dress, motor activity, and thought processes, able to follow commands   HENT: NCAT   Neck: no visualized mass   Resp: no respiratory distress   Neuro: no gross deficits   Skin: no discoloration or lesions of concern on visible areas   Psychiatric: normal affect, consistent with stated mood, no evidence of hallucinations     Additional exam findings: overweight    Results for orders placed or performed in visit on 04/18/22   AMB POC URINALYSIS DIP STICK AUTO W/O MICRO   Result Value Ref Range    Color (UA POC) Orange     Clarity (UA POC) Slightly Cloudy     Glucose (UA POC) Negative Negative    Bilirubin (UA POC) Negative Negative    Ketones (UA POC) Negative Negative    Specific gravity (UA POC) 1.025 1.001 - 1.035    Blood (UA POC) 1+ Negative    pH (UA POC) 7.0 4.6 - 8.0    Protein (UA POC) 1+ Negative    Urobilinogen (UA POC) 1 mg/dL 0.2 - 1    Nitrites (UA POC) Negative Negative    Leukocyte esterase (UA POC) Trace Negative         We discussed the expected course, resolution and complications of the diagnosis(es) in detail. Medication risks, benefits, costs, interactions, and alternatives were discussed as indicated. I advised him to contact the office if his condition worsens, changes or fails to improve as anticipated. He expressed understanding with the diagnosis(es) and plan. THIS VISIT WAS COMPLETED VIRTUALLY USING MY CHART TELEMEDICINE    Gretchen Syo, was evaluated through a synchronous (real-time) audio-video encounter. The patient (or guardian if applicable) is aware that this is a billable service, which includes applicable co-pays. Verbal consent to proceed has been obtained. The visit was conducted pursuant to the emergency declaration under the [address] [place] [address] authority and the [employer] General Act. Patient identification was verified, and a caregiver was present when appropriate. The patient was located at home in a state where the provider was licensed to provide care.     Dina Camara MD

## 2022-04-18 NOTE — LETTER
4/29/2022 7:39 AM    Mr. Baron Reeves  [address] [address]    Results for orders placed or performed in visit on 04/18/22   CULTURE, URINE    Specimen: Urine   Result Value Ref Range    Urine Culture, Routine Escherichia coli  Cefazolin <=4 ug/mL   (A)        Susceptibility    Escherichia coli -  (no method available)*     Amoxicillin/Clavulanic A S Susceptible ug/mL     Ampicillin ($) R Resistant ug/mL     Cefepime ($$) S Susceptible ug/mL     Ceftriaxone ($) S Susceptible ug/mL     Cefuroxime ($) S Susceptible ug/mL     Ciprofloxacin ($) S Susceptible ug/mL     Ertapenem ($$$$) S Susceptible ug/mL     Gentamicin ($) S Susceptible ug/mL     Imipenem S Susceptible ug/mL     Levofloxacin ($) S Susceptible ug/mL     Meropenem ($$) S Susceptible ug/mL     Nitrofurantoin S Susceptible ug/mL     Piperacillin/Tazobac ($) S Susceptible ug/mL     Tetracycline S Susceptible ug/mL     Tobramycin ($) S Susceptible ug/mL     Trimeth-Sulfamethoxa R Resistant ug/mL     * Performed at:  [address]  [id number] Director: Onelia Sesay MD, Phone:  [phone]   AMB POC URINALYSIS DIP STICK AUTO W/O MICRO   Result Value Ref Range    Color (UA POC) Orange     Clarity (UA POC) Slightly Cloudy     Glucose (UA POC) Negative Negative    Bilirubin (UA POC) Negative Negative    Ketones (UA POC) Negative Negative    Specific gravity (UA POC) 1.025 1.001 - 1.035    Blood (UA POC) 1+ Negative    pH (UA POC) 7.0 4.6 - 8.0    Protein (UA POC) 1+ Negative    Urobilinogen (UA POC) 1 mg/dL 0.2 - 1    Nitrites (UA POC) Negative Negative    Leukocyte esterase (UA POC) Trace Negative     RECOMMENDATIONS  Message sent to patient by My Chart:  Your urine culture showed you still had a urinary tract infection but the antibiotic prescribed (nitrofurantoin) should have treated it.           Sincerely,      Esther Frederick MD

## 2022-04-19 LAB
25(OH)D3+25(OH)D2 SERPL-MCNC: 35.8 NG/ML (ref 30–100)
ALBUMIN SERPL-MCNC: 4.2 G/DL (ref 4–5)
ALBUMIN/GLOB SERPL: 1.8 {RATIO} (ref 1.2–2.2)
ALP SERPL-CCNC: 93 IU/L (ref 44–121)
ALT SERPL-CCNC: 12 IU/L (ref 0–44)
APPEARANCE UR: ABNORMAL
AST SERPL-CCNC: 18 IU/L (ref 0–40)
BACTERIA #/AREA URNS HPF: ABNORMAL /[HPF]
BASOPHILS # BLD AUTO: 0.1 X10E3/UL (ref 0–0.2)
BASOPHILS NFR BLD AUTO: 1 %
BILIRUB SERPL-MCNC: 0.3 MG/DL (ref 0–1.2)
BILIRUB UR QL STRIP: NEGATIVE
BUN SERPL-MCNC: 21 MG/DL (ref 6–20)
BUN/CREAT SERPL: 14 (ref 9–20)
CALCIUM SERPL-MCNC: 8.8 MG/DL (ref 8.7–10.2)
CASTS URNS QL MICRO: ABNORMAL /LPF
CHLORIDE SERPL-SCNC: 104 MMOL/L (ref 96–106)
CHOLEST SERPL-MCNC: 148 MG/DL (ref 100–199)
CO2 SERPL-SCNC: 22 MMOL/L (ref 20–29)
COLOR UR: YELLOW
CREAT SERPL-MCNC: 1.46 MG/DL (ref 0.76–1.27)
EGFR: 64 ML/MIN/1.73
EOSINOPHIL # BLD AUTO: 0.3 X10E3/UL (ref 0–0.4)
EOSINOPHIL NFR BLD AUTO: 3 %
EPI CELLS #/AREA URNS HPF: ABNORMAL /HPF (ref 0–10)
ERYTHROCYTE [DISTWIDTH] IN BLOOD BY AUTOMATED COUNT: 11.7 % (ref 11.6–15.4)
EST. AVERAGE GLUCOSE BLD GHB EST-MCNC: 103 MG/DL
GLOBULIN SER CALC-MCNC: 2.3 G/DL (ref 1.5–4.5)
GLUCOSE SERPL-MCNC: 246 MG/DL (ref 65–99)
GLUCOSE UR QL STRIP: NEGATIVE
HBA1C MFR BLD: 5.2 % (ref 4.8–5.6)
HCT VFR BLD AUTO: 40.5 % (ref 37.5–51)
HDLC SERPL-MCNC: 32 MG/DL
HGB BLD-MCNC: 13.2 G/DL (ref 13–17.7)
HGB UR QL STRIP: ABNORMAL
IMM GRANULOCYTES # BLD AUTO: 0 X10E3/UL (ref 0–0.1)
IMM GRANULOCYTES NFR BLD AUTO: 0 %
KETONES UR QL STRIP: NEGATIVE
LDLC SERPL CALC-MCNC: 92 MG/DL (ref 0–99)
LEUKOCYTE ESTERASE UR QL STRIP: ABNORMAL
LYMPHOCYTES # BLD AUTO: 4.2 X10E3/UL (ref 0.7–3.1)
LYMPHOCYTES NFR BLD AUTO: 38 %
MCH RBC QN AUTO: 27.8 PG (ref 26.6–33)
MCHC RBC AUTO-ENTMCNC: 32.6 G/DL (ref 31.5–35.7)
MCV RBC AUTO: 85 FL (ref 79–97)
MICRO URNS: ABNORMAL
MONOCYTES # BLD AUTO: 1 X10E3/UL (ref 0.1–0.9)
MONOCYTES NFR BLD AUTO: 9 %
NEUTROPHILS # BLD AUTO: 5.4 X10E3/UL (ref 1.4–7)
NEUTROPHILS NFR BLD AUTO: 49 %
NITRITE UR QL STRIP: POSITIVE
PH UR STRIP: 6 [PH] (ref 5–7.5)
PLATELET # BLD AUTO: 325 X10E3/UL (ref 150–450)
POTASSIUM SERPL-SCNC: 4.3 MMOL/L (ref 3.5–5.2)
PROT SERPL-MCNC: 6.5 G/DL (ref 6–8.5)
PROT UR QL STRIP: ABNORMAL
PSA SERPL-MCNC: 3.3 NG/ML (ref 0–4)
RBC # BLD AUTO: 4.75 X10E6/UL (ref 4.14–5.8)
RBC #/AREA URNS HPF: >30 /HPF (ref 0–2)
SODIUM SERPL-SCNC: 142 MMOL/L (ref 134–144)
SP GR UR STRIP: >=1.03 (ref 1–1.03)
TESTOST FREE SERPL-MCNC: 4.1 PG/ML (ref 8.7–25.1)
TESTOST SERPL-MCNC: 465 NG/DL (ref 264–916)
TRIGL SERPL-MCNC: 134 MG/DL (ref 0–149)
TSH SERPL DL<=0.005 MIU/L-ACNC: 3.51 UIU/ML (ref 0.45–4.5)
UROBILINOGEN UR STRIP-MCNC: 1 MG/DL (ref 0.2–1)
VLDLC SERPL CALC-MCNC: 24 MG/DL (ref 5–40)
WBC # BLD AUTO: 10.9 X10E3/UL (ref 3.4–10.8)
WBC #/AREA URNS HPF: >30 /HPF (ref 0–5)

## 2022-04-20 NOTE — PROGRESS NOTES
I called the patient and verified them by name and date of birth. I informed him on the message from ALBINA Macario. He stated understanding and is scheduled for 04.29 and would like referral information sent via [employer].

## 2022-04-20 NOTE — PROGRESS NOTES
Can you schedule Mr. Pugh [place] for next fridays' 4pm slot to go over labs.    Can you send him Hank Posadas # for endo referral--printed

## 2022-04-21 LAB — BACTERIA UR CULT: ABNORMAL

## 2022-04-24 NOTE — PROGRESS NOTES
Message sent to patient by My Chart:  Your urine culture showed you still had a urinary tract infection but the antibiotic prescribed (nitrofurantoin) should have treated it.     Dr. Yao Sick

## 2022-04-29 ENCOUNTER — VIRTUAL VISIT (OUTPATIENT)
Dept: INTERNAL MEDICINE CLINIC | Age: 32
End: 2022-04-29
Payer: COMMERCIAL

## 2022-04-29 VITALS — HEIGHT: 74 IN

## 2022-04-29 DIAGNOSIS — R73.09 ABNORMAL BLOOD SUGAR: ICD-10-CM

## 2022-04-29 DIAGNOSIS — N30.00 ACUTE CYSTITIS WITHOUT HEMATURIA: ICD-10-CM

## 2022-04-29 DIAGNOSIS — E66.01 MORBID OBESITY (HCC): ICD-10-CM

## 2022-04-29 DIAGNOSIS — J45.20 MILD INTERMITTENT ASTHMA WITHOUT COMPLICATION: ICD-10-CM

## 2022-04-29 DIAGNOSIS — R53.83 FATIGUE, UNSPECIFIED TYPE: ICD-10-CM

## 2022-04-29 DIAGNOSIS — R79.89 LOW TESTOSTERONE: Primary | ICD-10-CM

## 2022-04-29 DIAGNOSIS — J30.1 SEASONAL ALLERGIC RHINITIS DUE TO POLLEN: ICD-10-CM

## 2022-04-29 PROCEDURE — 99214 OFFICE O/P EST MOD 30 MIN: CPT | Performed by: PHYSICIAN ASSISTANT

## 2022-04-29 RX ORDER — TOPIRAMATE 50 MG/1
50 TABLET, FILM COATED ORAL 2 TIMES DAILY
Qty: 60 TABLET | Refills: 5 | Status: SHIPPED | OUTPATIENT
Start: 2022-04-29

## 2022-04-29 NOTE — PROGRESS NOTES
Penny Knapp is a 27 y.o. male who was seen by synchronous (real-time) audio-video technology on 4/29/2022 for Results        Assessment & Plan:   Diagnoses and all orders for this visit:    1. Low testosterone  Needs to see endo. Referral placed to Dr. Addie Lima  2. Abnormal blood sugar  Will return next week for in house fasting POC BG  3. Fatigue, unspecified type  Likely combo of allergies, low T and potential BG problem  4. Morbid obesity (HCC)  -     topiramate (TOPAMAX) 50 mg tablet; Take 1 Tablet by mouth two (2) times a day. Continue metformin, increase to 50 mg bid topamax. Discussed use of wegovy in future   5. Acute cystitis without hematuria  Seemingly resolved on macrobid. Have sensitivity results if returns   6. Mild intermittent asthma without complication  At goal  7. Seasonal allergic rhinitis due to pollen  At goal    The complexity of medical decision making for this visit is moderate     I spent at least 25 minutes on this visit with this established patient. 712  Subjective:   Patient presents via mychart virtual visit. Labs showed low free testosterone. Per last visit he attempted transition male to female in 2017 and did not complete. Has not decided to remain male in hormone and has had resulting problems with ejaculation/orgasm as described in previous note and general fatigue. Had significant low free testosterone and low end of normal serum. Also had high BG but normal A1C. Admits he may have eating in morning during night but is not quite sure. Has strong family history of DM2. Has started topamax 25 mg BID and metformin without side effect. Wishes to increase to 50 mg topamax BID. Allergies/asthma stable on meds. Prilosec working for GERD and hx of hiatal hernia. Prior to Admission medications    Medication Sig Start Date End Date Taking? Authorizing Provider   multivitamin (ONE A DAY) tablet Take 1 Tablet by mouth daily.    Yes Provider, Historical   Lactobacillus acidophilus (PROBIOTIC PO) Take  by mouth. Yes Provider, Historical   fluticasone propionate (Flonase Allergy Relief) 50 mcg/actuation nasal spray 2 Sprays by Both Nostrils route daily. Yes Provider, Historical   albuterol (PROVENTIL HFA, VENTOLIN HFA, PROAIR HFA) 90 mcg/actuation inhaler Take 1-2 Puffs by inhalation every four (4) hours as needed for Wheezing for up to 90 days. 4/14/22 7/13/22 Yes Sebas Veronica PA-C   fexofenadine (ALLEGRA) 60 mg tablet Take 1 Tablet by mouth daily. 4/14/22  Yes Sebas Veronica PA-C   montelukast (SINGULAIR) 10 mg tablet Take 1 Tablet by mouth daily. 4/14/22  Yes Sebas Veronica PA-C   Omeprazole delayed release (PRILOSEC D/R) 20 mg tablet Take 1 Tablet by mouth daily. 4/14/22  Yes Sebas Veronica PA-C   topiramate (TOPAMAX) 25 mg tablet Take 1 Tablet by mouth two (2) times a day. 4/14/22  Yes Sebas Veronica PA-C   metFORMIN (GLUCOPHAGE) 500 mg tablet Take 1 Tablet by mouth daily (with dinner).  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    Mild intermittent asthma without complication Q77.54    Hiatal hernia K44.9    Gastroesophageal reflux disease without esophagitis K21.9    Abnormal blood sugar R73.09    Low testosterone R79.89    Seasonal allergic rhinitis due to pollen J30.1     Patient Active Problem List    Diagnosis Date Noted    Abnormal blood sugar 04/29/2022    Low testosterone 04/29/2022    Seasonal allergic rhinitis due to pollen 04/29/2022    Non-seasonal allergic rhinitis 04/14/2022    Cerebral aneurysm 04/14/2022    Morbid obesity ([facility] [address] 04/14/2022    Snoring 04/14/2022    Family history of diabetes mellitus 04/14/2022    Orgasmic dysfunction 04/14/2022    Mild intermittent asthma without complication 31/27/8580    Hiatal hernia 04/14/2022    Gastroesophageal reflux disease without esophagitis 04/14/2022     Current Outpatient Medications   Medication Sig Dispense Refill    multivitamin (ONE A DAY) tablet Take 1 Tablet by mouth daily.  Lactobacillus acidophilus (PROBIOTIC PO) Take  by mouth.  fluticasone propionate (Flonase Allergy Relief) 50 mcg/actuation nasal spray 2 Sprays by Both Nostrils route daily.  albuterol (PROVENTIL HFA, VENTOLIN HFA, PROAIR HFA) 90 mcg/actuation inhaler Take 1-2 Puffs by inhalation every four (4) hours as needed for Wheezing for up to 90 days. 18 g 0    fexofenadine (ALLEGRA) 60 mg tablet Take 1 Tablet by mouth daily. 90 Tablet 3    montelukast (SINGULAIR) 10 mg tablet Take 1 Tablet by mouth daily. 30 Tablet 5    Omeprazole delayed release (PRILOSEC D/R) 20 mg tablet Take 1 Tablet by mouth daily. 90 Tablet 3    topiramate (TOPAMAX) 25 mg tablet Take 1 Tablet by mouth two (2) times a day. 60 Tablet 5    metFORMIN (GLUCOPHAGE) 500 mg tablet Take 1 Tablet by mouth daily (with dinner). 30 Tablet 5     No Known Allergies  Past Medical History:   Diagnosis Date    Brain aneurysm 2017     Past Surgical History:   Procedure Laterality Date    HX OTHER SURGICAL  2017    surgery for brain aneurysm     Family History   Problem Relation Age of Onset    Diabetes Mother     Hypertension Mother    [facility] Elevated Lipids Mother     No Known Problems Father      Social History     Tobacco Use    Smoking status: Current Every Day Smoker     Packs/day: 1.00     Types: Cigarettes    Smokeless tobacco: Never Used   Substance Use Topics    Alcohol use: Yes     Comment: occasional        Review of Systems   Constitutional: Positive for malaise/fatigue. Negative for chills, fever and weight loss. Respiratory: Negative for cough, shortness of breath and wheezing. Cardiovascular: Negative for chest pain, palpitations and leg swelling.    Gastrointestinal: Negative for abdominal pain, blood in stool, constipation, diarrhea, heartburn, melena, nausea and vomiting. Genitourinary: Negative for dysuria and frequency. Skin: Negative for rash. Neurological: Negative for dizziness, weakness and headaches. Endo/Heme/Allergies: Does not bruise/bleed easily. All other systems reviewed and are negative. Objective:     Patient-Reported Vitals 4/29/2022   Patient-Reported Weight 304      General: alert, cooperative, no distress   Mental  status: normal mood, behavior, speech, dress, motor activity, and thought processes, able to follow commands   HENT: NCAT   Neck: no visualized mass   Resp: no respiratory distress   Neuro: no gross deficits   Skin: no discoloration or lesions of concern on visible areas   Psychiatric: normal affect, consistent with stated mood, no evidence of hallucinations     Additional exam findings: We discussed the expected course, resolution and complications of the diagnosis(es) in detail. Medication risks, benefits, costs, interactions, and alternatives were discussed as indicated. I advised him to contact the office if his condition worsens, changes or fails to improve as anticipated. He expressed understanding with the diagnosis(es) and plan. Shekhar Simpson was evaluated through a synchronous (real-time) audio-video encounter. The patient (or guardian if applicable) is aware that this is a billable service, which includes applicable co-pays. Verbal consent to proceed has been obtained. The visit was conducted pursuant to the emergency declaration under the [facility], [address] [place] [address] [facility] authority and the [employer] Act. Patient identification was verified, and a caregiver was present when appropriate. The patient was located at home in a state where the provider was licensed to provide care.     Feng Branham PA-C

## 2022-04-29 NOTE — PATIENT INSTRUCTIONS
Preventing Falls: Care Instructions  Your Care Instructions     Getting around your home safely can be a challenge if you have injuries or health problems that make it easy for you to fall. Loose rugs and furniture in walkways are among the dangers for many older people who have problems walking or who have poor eyesight. People who have conditions such as arthritis, osteoporosis, or dementia also have to be careful not to fall. You can make your home safer with a few simple measures. Follow-up care is a key part of your treatment and safety. Be sure to make and go to all appointments, and call your doctor if you are having problems. It's also a good idea to know your test results and keep a list of the medicines you take. How can you care for yourself at home? Taking care of yourself  · Exercise regularly to improve your strength, muscle tone, and balance. Walk if you can. Swimming may be a good choice if you cannot walk easily. · Have your vision and hearing checked each year or any time you notice a change. If you have trouble seeing and hearing, you might not be able to avoid objects and could lose your balance. · Know the side effects of the medicines you take. Ask your doctor or pharmacist whether the medicines you take can affect your balance. Sleeping pills or sedatives can affect your balance. · Limit the amount of alcohol you drink. Alcohol can impair your balance and other senses. · Ask your doctor whether calluses or corns on your feet need to be removed. If you wear loose-fitting shoes because of calluses or corns, you can lose your balance and fall. · Talk to your doctor if you have numbness in your feet. · You may get dizzy if you do not drink enough water. To prevent dehydration, drink plenty of fluids. Choose water and other clear liquids.  If you have kidney, heart, or liver disease and have to limit fluids, talk with your doctor before you increase the amount of fluids you drink.  Preventing falls at home  · Remove raised doorway thresholds, throw rugs, and clutter. Repair loose carpet or raised areas in the floor. · Move furniture and electrical cords to keep them out of walking paths. · Use nonskid floor wax, and wipe up spills right away, especially on ceramic tile floors. · If you use a walker or cane, put rubber tips on it. If you use crutches, clean the bottoms of them regularly with an abrasive pad, such as steel wool. · Keep your house well lit, especially Jerline Mariya, and outside walkways. Use night-lights in areas such as hallways and bathrooms. Add extra light switches or use remote switches (such as switches that go on or off when you clap your hands) to make it easier to turn lights on if you have to get up during the night. · Install sturdy handrails on stairways. · Move items in your cabinets so that the things you use a lot are on the lower shelves (about waist level). · Keep a cordless phone and a flashlight with new batteries by your bed. If possible, put a phone in each of the main rooms of your house, or carry a cell phone in case you fall and cannot reach a phone. Or, you can wear a device around your neck or wrist. You push a button that sends a signal for help. · Wear low-heeled shoes that fit well and give your feet good support. Use footwear with nonskid soles. Check the heels and soles of your shoes for wear. Repair or replace worn heels or soles. · Do not wear socks without shoes on wood floors. · Walk on the grass when the sidewalks are slippery. If you live in an area that gets snow and ice in the winter, sprinkle salt on slippery steps and sidewalks. Or ask a family member or friend to do this for you. Preventing falls in the bath  · Install grab bars and nonskid mats inside and outside your shower or tub and near the toilet and sinks. · Use shower chairs and bath benches.   · Use a hand-held shower head that will allow you to sit while showering. · Get into a tub or shower by putting the weaker leg in first. Get out of a tub or shower with your strong side first.  · Repair loose toilet seats and consider installing a raised toilet seat to make getting on and off the toilet easier. · Keep your bathroom door unlocked while you are in the shower. Where can you learn more? Go to http://www.pereira.com/  Enter G117 in the search box to learn more about \"Preventing Falls: Care Instructions. \"  Current as of: September 8, 2021               Content Version: 13.2  © 2773-0186 [employer], [employer]. Care instructions adapted under license by [employer] (which disclaims liability or warranty for this information). If you have questions about a medical condition or this instruction, always ask your healthcare professional. [address] any warranty or liability for your use of this information.

## 2022-04-29 NOTE — PROGRESS NOTES
Francisco Kim  Identified pt with two pt identifiers(name and ). Chief Complaint   Patient presents with    Results       Reviewed record In preparation for visit and have obtained necessary documentation. 1. Have you been to the ER, urgent care clinic or hospitalized since your last visit? No     2. Have you seen or consulted any other health care providers outside of the [address] [place] [address] since your last visit? Include any pap smears or colon screening. No    Vitals reviewed with provider. Health Maintenance reviewed:     Health Maintenance Due   Topic    Hepatitis C Screening     Pneumococcal 0-64 years (1 - PCV)    COVID-19 Vaccine (3 - Booster for [employer] series)          Wt Readings from Last 3 Encounters:   No data found for Wt        Temp Readings from Last 3 Encounters:   No data found for Temp        BP Readings from Last 3 Encounters:   No data found for BP        Pulse Readings from Last 3 Encounters:   No data found for Pulse        Vitals:    22 1500   Height: 6' 2\" (1.88 m)   PainSc:   0 - No pain          Learning Assessment:   :       Learning Assessment 2022   PRIMARY LEARNER Patient   HIGHEST LEVEL OF EDUCATION - PRIMARY LEARNER  GRADUATED HIGH SCHOOL OR GED   BARRIERS PRIMARY LEARNER NONE   PRIMARY LANGUAGE ENGLISH   LEARNER PREFERENCE PRIMARY DEMONSTRATION   ANSWERED BY Patient   RELATIONSHIP SELF        Depression Screening:   :       3 most recent PHQ Screens 2022   Little interest or pleasure in doing things Not at all   Feeling down, depressed, irritable, or hopeless Not at all   Total Score PHQ 2 0        Fall Risk Assessment:   :     No flowsheet data found. Abuse Screening:   :     No flowsheet data found.      ADL Screening:   :       ADL Assessment 2022   Feeding yourself No Help Needed   Getting from bed to chair No Help Needed   Getting dressed No Help Needed   Bathing or showering No Help Needed   Walk across the room (includes cane/walker) No Help Needed   Using the telphone No Help Needed   Taking your medications No Help Needed   Preparing meals No Help Needed   Managing money (expenses/bills) No Help Needed   Moderately strenuous housework (laundry) No Help Needed   Shopping for personal items (toiletries/medicines) No Help Needed   Shopping for groceries No Help Needed   Driving No Help Needed   Climbing a flight of stairs No Help Needed   Getting to places beyond walking distances No Help Needed

## 2022-05-19 ENCOUNTER — VIRTUAL VISIT (OUTPATIENT)
Dept: INTERNAL MEDICINE CLINIC | Age: 32
End: 2022-05-19
Payer: COMMERCIAL

## 2022-05-19 DIAGNOSIS — J01.40 ACUTE NON-RECURRENT PANSINUSITIS: Primary | ICD-10-CM

## 2022-05-19 DIAGNOSIS — J30.89 NON-SEASONAL ALLERGIC RHINITIS, UNSPECIFIED TRIGGER: ICD-10-CM

## 2022-05-19 PROCEDURE — 99213 OFFICE O/P EST LOW 20 MIN: CPT | Performed by: NURSE PRACTITIONER

## 2022-05-19 RX ORDER — AZITHROMYCIN 250 MG/1
250 TABLET, FILM COATED ORAL SEE ADMIN INSTRUCTIONS
Qty: 6 TABLET | Refills: 0 | Status: SHIPPED | OUTPATIENT
Start: 2022-05-19 | End: 2022-05-24

## 2022-05-19 NOTE — PROGRESS NOTES
Shekhar Simpson is a 27 y.o. male who was seen by synchronous (real-time) audio-video technology on 5/19/2022 for Sinus Infection        Assessment & Plan:     Diagnoses and all orders for this visit:    1. Acute non-recurrent pansinusitis  -     azithromycin (ZITHROMAX) 250 mg tablet; Take 1 Tablet by mouth See Admin Instructions for 5 days. Call if sx worsening or not improving    2. Non-seasonal allergic rhinitis, unspecified trigger  Continue allergy meds    Follow-up and Dispositions    · Return if symptoms worsen or fail to improve. Subjective:     Pt c/o sinus infection symptoms x 2 weeks. Has chronic allergies. Taking flonase allegra and singulair. Starting to feel more fatigued, upset stomach from mucus, headache. Denies n/v/d. Eating normally. Has sense of taste and smell intact. Lots of sinus pressure. Denies fevers or chills. Has had some hot spells. Allergies flaring up more bc his room is luz. He also works outside. Prior to Admission medications    Medication Sig Start Date End Date Taking? Authorizing Provider   topiramate (TOPAMAX) 50 mg tablet Take 1 Tablet by mouth two (2) times a day. 4/29/22  Yes Reese Garcia PA-C   multivitamin (ONE A DAY) tablet Take 1 Tablet by mouth daily. Yes Provider, Historical   Lactobacillus acidophilus (PROBIOTIC PO) Take  by mouth. Yes Provider, Historical   fluticasone propionate (Flonase Allergy Relief) 50 mcg/actuation nasal spray 2 Sprays by Both Nostrils route daily. Yes Provider, Historical   albuterol (PROVENTIL HFA, VENTOLIN HFA, PROAIR HFA) 90 mcg/actuation inhaler Take 1-2 Puffs by inhalation every four (4) hours as needed for Wheezing for up to 90 days. 4/14/22 7/13/22 Yes Reese Garcia PA-C   fexofenadine (ALLEGRA) 60 mg tablet Take 1 Tablet by mouth daily. 4/14/22  Yes Reese Garcia PA-C   montelukast (SINGULAIR) 10 mg tablet Take 1 Tablet by mouth daily.  4/14/22  Yes Reese Garcia PA-C   Omeprazole delayed release (PRILOSEC D/R) 20 mg tablet Take 1 Tablet by mouth daily. 4/14/22  Yes Lacretia ALBINA Sofia   metFORMIN (GLUCOPHAGE) 500 mg tablet Take 1 Tablet by mouth daily (with dinner). 4/14/22  Yes Lacretia ALBINA Sofia see hpi  This visit was completed virtually using [email or web address] me       Objective:     Patient-Reported Vitals 4/29/2022   Patient-Reported Weight 304        [INSTRUCTIONS:  \"[x]\" Indicates a positive item  \"[]\" Indicates a negative item  -- DELETE ALL ITEMS NOT EXAMINED]    Constitutional: [x] Appears well-developed and well-nourished [x] No apparent distress      [] Abnormal -     Mental status: [x] Alert and awake  [x] Oriented to person/place/time [x] Able to follow commands    [] Abnormal -     Eyes:   EOM    [x]  Normal    [] Abnormal -   Sclera  [x]  Normal    [] Abnormal -          Discharge [x]  None visible   [] Abnormal -     HENT: [x] Normocephalic, atraumatic  [] Abnormal -   [x] Mouth/Throat: Mucous membranes are moist    External Ears [x] Normal  [] Abnormal -    Neck: [x] No visualized mass [] Abnormal -     Pulmonary/Chest: [x] Respiratory effort normal   [x] No visualized signs of difficulty breathing or respiratory distress        [] Abnormal -      Musculoskeletal:   [x] Normal gait with no signs of ataxia         [x] Normal range of motion of neck        [] Abnormal -     Neurological:        [x] No Facial Asymmetry (Cranial nerve 7 motor function) (limited exam due to video visit)          [x] No gaze palsy        [] Abnormal -          Skin:        [x] No significant exanthematous lesions or discoloration noted on facial skin         [] Abnormal -            Psychiatric:       [x] Normal Affect [] Abnormal -        [x] No Hallucinations    Other pertinent observable physical exam findings:-        We discussed the expected course, resolution and complications of the diagnosis(es) in detail.   Medication risks, benefits, costs, interactions, and alternatives were discussed as indicated. I advised him to contact the office if his condition worsens, changes or fails to improve as anticipated. He expressed understanding with the diagnosis(es) and plan. Penny Knapp, was evaluated through a synchronous (real-time) audio-video encounter. The patient (or guardian if applicable) is aware that this is a billable service, which includes applicable co-pays. Verbal consent to proceed has been obtained. The visit was conducted pursuant to the emergency declaration under the [address] [place] [address] authority and the [facility] and [employer] General Act. Patient identification was verified, and a caregiver was present when appropriate. The patient was located at home in a state where the provider was licensed to provide care.       Martha Smith NP

## 2022-05-19 NOTE — PROGRESS NOTES
Identified pt with two pt identifiers(name and ). Reviewed record in preparation for visit and have obtained necessary documentation. Chief Complaint   Patient presents with    Sinus Infection        There were no vitals filed for this visit. Health Maintenance Due   Topic    Hepatitis C Screening     Pneumococcal 0-64 years (1 - PCV)    COVID-19 Vaccine (3 - Booster for [facility] series)       1. \"Have you been to the ER, urgent care clinic since your last visit? Hospitalized since your last visit? \" No    2. \"Have you seen or consulted any other health care providers outside of the [address] [place] [address] since your last visit? \" No     3. For patients over 45: Has the patient had a colonoscopy? NA - based on age     If the patient is female:    4. For patients over 36: Has the patient had a mammogram? NA - based on age    11. For patients over 21: Has the patient had a pap smear?  NA - based on age    Current Outpatient Medications   Medication Instructions    albuterol (PROVENTIL HFA, VENTOLIN HFA, PROAIR HFA) 90 mcg/actuation inhaler 1-2 Puffs, Inhalation, EVERY 4 HOURS AS NEEDED    fexofenadine (ALLEGRA) 60 mg, Oral, DAILY    fluticasone propionate (Flonase Allergy Relief) 50 mcg/actuation nasal spray 2 Sprays, Both Nostrils, DAILY    Lactobacillus acidophilus (PROBIOTIC PO) Oral    metFORMIN (GLUCOPHAGE) 500 mg, Oral, DAILY WITH DINNER    montelukast (SINGULAIR) 10 mg, Oral, DAILY    multivitamin (ONE A DAY) tablet 1 Tablet, Oral, DAILY    Omeprazole delayed release (PRILOSEC D/R) 20 mg, Oral, DAILY    topiramate (TOPAMAX) 50 mg, Oral, 2 TIMES DAILY       No Known Allergies    Immunization History   Administered Date(s) Administered    COVID-19, Pfizer Purple top, DILUTE for use, 12+ yrs, 30mcg/0.3mL dose 2021, 2021       Past Medical History:   Diagnosis Date    Brain aneurysm

## 2022-06-08 ENCOUNTER — TELEPHONE (OUTPATIENT)
Dept: INTERNAL MEDICINE CLINIC | Age: 32
End: 2022-06-08

## 2022-06-08 NOTE — TELEPHONE ENCOUNTER
Patient requested we fill and submit a work-at-home accomodation form. If possible, patient would like it accessible via their [employer] file so they can print as soon it's available. Please call patient if there are any questions or issues.     CB: [phone]

## 2022-06-09 ENCOUNTER — VIRTUAL VISIT (OUTPATIENT)
Dept: INTERNAL MEDICINE CLINIC | Age: 32
End: 2022-06-09
Payer: COMMERCIAL

## 2022-06-09 DIAGNOSIS — R20.2 BILATERAL NUMBNESS AND TINGLING OF ARMS AND LEGS: Primary | ICD-10-CM

## 2022-06-09 DIAGNOSIS — J45.20 MILD INTERMITTENT ASTHMA WITHOUT COMPLICATION: ICD-10-CM

## 2022-06-09 DIAGNOSIS — R79.89 LOW TESTOSTERONE: ICD-10-CM

## 2022-06-09 DIAGNOSIS — E66.01 MORBID OBESITY (HCC): ICD-10-CM

## 2022-06-09 DIAGNOSIS — R20.0 BILATERAL NUMBNESS AND TINGLING OF ARMS AND LEGS: Primary | ICD-10-CM

## 2022-06-09 DIAGNOSIS — R73.09 ABNORMAL BLOOD SUGAR: ICD-10-CM

## 2022-06-09 PROCEDURE — 99213 OFFICE O/P EST LOW 20 MIN: CPT | Performed by: PHYSICIAN ASSISTANT

## 2022-06-09 NOTE — LETTER
6/9/2022 8:22 AM    Mr. Smiley Cronin  [address] [address]      To whom it may concern,    I am formally recommending my patient Smiley Cronin be allowed to work from home to avoid potential exposures and infections related to COVID-19. He has multiple co-morbidities that would put him in a high risk category if he were to become infected, even with vaccination. The rise and fall of COVID-19 numbers are fluid and often less reported with at home testing so I am recommending an initial trial of 6 months of work at home and then re-assessment at 6 months (12-9-22). Feel free to contact my office with any questions or concerns.          Sincerely,      Jessica Jacobson PA-C

## 2022-06-09 NOTE — PROGRESS NOTES
Identified pt with two pt identifiers(name and ). Reviewed record in preparation for visit and have obtained necessary documentation. Chief Complaint   Patient presents with    Letter for School/Work        Health Maintenance Due   Topic    Hepatitis C Screening     Pneumococcal 0-64 years (1 - PCV)    COVID-19 Vaccine (3 - Booster for [place] series)       1. \"Have you been to the ER, urgent care clinic since your last visit? Hospitalized since your last visit? \" No    2. \"Have you seen or consulted any other health care providers outside of the [address] [place] [address] since your last visit? \" No     3. For patients over 45: Has the patient had a colonoscopy? NA - based on age     If the patient is female:    4. For patients over 36: Has the patient had a mammogram? NA - based on age    11. For patients over 21: Has the patient had a pap smear?  NA - based on age    Current Outpatient Medications   Medication Instructions    albuterol (PROVENTIL HFA, VENTOLIN HFA, PROAIR HFA) 90 mcg/actuation inhaler 1-2 Puffs, Inhalation, EVERY 4 HOURS AS NEEDED    fexofenadine (ALLEGRA) 60 mg, Oral, DAILY    fluticasone propionate (Flonase Allergy Relief) 50 mcg/actuation nasal spray 2 Sprays, Both Nostrils, DAILY    Lactobacillus acidophilus (PROBIOTIC PO) Oral    metFORMIN (GLUCOPHAGE) 500 mg, Oral, DAILY WITH DINNER    montelukast (SINGULAIR) 10 mg, Oral, DAILY    multivitamin (ONE A DAY) tablet 1 Tablet, Oral, DAILY    Omeprazole delayed release (PRILOSEC D/R) 20 mg, Oral, DAILY    topiramate (TOPAMAX) 50 mg, Oral, 2 TIMES DAILY       No Known Allergies    Immunization History   Administered Date(s) Administered    COVID-19, Pfizer Purple top, DILUTE for use, 12+ yrs, 30mcg/0.3mL dose 2021, 2021       Past Medical History:   Diagnosis Date    Brain aneurysm

## 2022-06-09 NOTE — PROGRESS NOTES
Silvana Blue is a 27 y.o. male who was seen by synchronous (real-time) audio-video technology on 6/9/2022 for Letter for School/Work        Assessment & Plan:   Diagnoses and all orders for this visit:    1. Bilateral numbness and tingling of arms and legs  Suspect neuropathy in LE but will need to eval in person for UE as it may be pinched nerve  2. Mild intermittent asthma without complication  Will write letter for 6 months work at home with covid numbers irregular and patent being higher risk with morbid obesity  3. Abnormal blood sugar  Recheck labs, mailed to him for repeat in july  4. Low testosterone  Needs to follow up with Urology  5. Morbid obesity ([address]  Continue weight loss measures and exercises     The complexity of medical decision making for this visit is moderate     I spent at least 25 minutes on this visit with this established patient. 712  Subjective:   Patient presents via virtual visit on [employer] to request letter of exemption for in person working from his current job. He has a hx of asthma and is in process of work up for diabetes with irregular fasting blood sugars and is about to start a new job at [employer]. He has concerns over his coworkers having fake vaccine cards and the possibility of getting sick. He is requesting to work from home. He has been working out more and trying to lose weight and has succeeded but was hoping to lose more. He continues to have fatigue but had to cancel and reschedule the specialist appts he was supposed to have and is planning to see them for low Testosterone in July. He reports some new onset BL feet tingling and numbness, no pain and reports his R UE has intermittent numbness. Prior to Admission medications    Medication Sig Start Date End Date Taking? Authorizing Provider   topiramate (TOPAMAX) 50 mg tablet Take 1 Tablet by mouth two (2) times a day.  4/29/22  Yes Jonetta Barthel, PA-C   multivitamin (ONE A DAY) tablet Take 1 Tablet by mouth daily. Yes Provider, Historical   Lactobacillus acidophilus (PROBIOTIC PO) Take  by mouth. Yes Provider, Historical   fluticasone propionate (Flonase Allergy Relief) 50 mcg/actuation nasal spray 2 Sprays by Both Nostrils route daily. Yes Provider, Historical   albuterol (PROVENTIL HFA, VENTOLIN HFA, PROAIR HFA) 90 mcg/actuation inhaler Take 1-2 Puffs by inhalation every four (4) hours as needed for Wheezing for up to 90 days. 4/14/22 7/13/22 Yes Paula Estrella PA-C   fexofenadine (ALLEGRA) 60 mg tablet Take 1 Tablet by mouth daily. 4/14/22  Yes Paula Estrella PA-C   montelukast (SINGULAIR) 10 mg tablet Take 1 Tablet by mouth daily. 4/14/22  Yes Paula Estrella PA-C   Omeprazole delayed release (PRILOSEC D/R) 20 mg tablet Take 1 Tablet by mouth daily. 4/14/22  Yes Paula Estrella PA-C   metFORMIN (GLUCOPHAGE) 500 mg tablet Take 1 Tablet by mouth daily (with dinner).  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    Mild intermittent asthma without complication L23.01    Hiatal hernia K44.9    Gastroesophageal reflux disease without esophagitis K21.9    Abnormal blood sugar R73.09    Low testosterone R79.89    Seasonal allergic rhinitis due to pollen J30.1     Patient Active Problem List    Diagnosis Date Noted    Abnormal blood sugar 04/29/2022    Low testosterone 04/29/2022    Seasonal allergic rhinitis due to pollen 04/29/2022    Non-seasonal allergic rhinitis 04/14/2022    Cerebral aneurysm 04/14/2022    Morbid obesity ([facility] [address] 04/14/2022    Snoring 04/14/2022    Family history of diabetes mellitus 04/14/2022    Orgasmic dysfunction 04/14/2022    Mild intermittent asthma without complication 66/11/1116    Hiatal hernia 04/14/2022    Gastroesophageal reflux disease without esophagitis 04/14/2022     Current Outpatient Medications   Medication Sig Dispense Refill    topiramate (TOPAMAX) 50 mg tablet Take 1 Tablet by mouth two (2) times a day. 60 Tablet 5    multivitamin (ONE A DAY) tablet Take 1 Tablet by mouth daily.  Lactobacillus acidophilus (PROBIOTIC PO) Take  by mouth.  fluticasone propionate (Flonase Allergy Relief) 50 mcg/actuation nasal spray 2 Sprays by Both Nostrils route daily.  albuterol (PROVENTIL HFA, VENTOLIN HFA, PROAIR HFA) 90 mcg/actuation inhaler Take 1-2 Puffs by inhalation every four (4) hours as needed for Wheezing for up to 90 days. 18 g 0    fexofenadine (ALLEGRA) 60 mg tablet Take 1 Tablet by mouth daily. 90 Tablet 3    montelukast (SINGULAIR) 10 mg tablet Take 1 Tablet by mouth daily. 30 Tablet 5    Omeprazole delayed release (PRILOSEC D/R) 20 mg tablet Take 1 Tablet by mouth daily. 90 Tablet 3    metFORMIN (GLUCOPHAGE) 500 mg tablet Take 1 Tablet by mouth daily (with dinner). 30 Tablet 5     No Known Allergies  Past Medical History:   Diagnosis Date    Brain aneurysm 2017     Past Surgical History:   Procedure Laterality Date    HX OTHER SURGICAL  2017    surgery for brain aneurysm     Family History   Problem Relation Age of Onset    Diabetes Mother     Hypertension Mother    Salomón Sanchez Elevated Lipids Mother     No Known Problems Father      Social History     Tobacco Use    Smoking status: Current Every Day Smoker     Packs/day: 1.00     Types: Cigarettes    Smokeless tobacco: Never Used   Substance Use Topics    Alcohol use: Yes     Comment: occasional        Review of Systems   Constitutional: Positive for malaise/fatigue. Negative for chills, fever and weight loss. Respiratory: Negative for cough, shortness of breath and wheezing. Cardiovascular: Negative for chest pain, palpitations and leg swelling.    Gastrointestinal: Negative for abdominal pain, blood in stool, constipation, diarrhea, heartburn, melena, nausea and vomiting. Genitourinary: Negative for dysuria and frequency. Skin: Negative for rash. Neurological: Positive for sensory change. Negative for dizziness, weakness and headaches. All other systems reviewed and are negative. Objective:     Patient-Reported Vitals 4/29/2022   Patient-Reported Weight 304      General: alert, cooperative, no distress   Mental  status: normal mood, behavior, speech, dress, motor activity, and thought processes, able to follow commands   HENT: NCAT   Neck: no visualized mass   Resp: no respiratory distress   Neuro: no gross deficits   Skin: no discoloration or lesions of concern on visible areas   Psychiatric: normal affect, consistent with stated mood, no evidence of hallucinations     Additional exam findings: We discussed the expected course, resolution and complications of the diagnosis(es) in detail. Medication risks, benefits, costs, interactions, and alternatives were discussed as indicated. I advised him to contact the office if his condition worsens, changes or fails to improve as anticipated. He expressed understanding with the diagnosis(es) and plan. [facility] was evaluated through a synchronous (real-time) audio-video encounter. The patient (or guardian if applicable) is aware that this is a billable service, which includes applicable co-pays. This Virtual Visit was conducted with patient's (and/or legal guardian's) consent. The visit was conducted pursuant to the emergency declaration under the [facility], [address] [place] [address] waiver authority and the [employer] General Act. Patient identification was verified, and a caregiver was present when appropriate. The patient was located at: Home: [address] [address]  The provider was located at:  Facility (Appt Department): [address] [place] [address] [place]

## 2022-06-10 ENCOUNTER — TELEPHONE (OUTPATIENT)
Dept: INTERNAL MEDICINE CLINIC | Age: 32
End: 2022-06-10

## 2022-06-10 NOTE — TELEPHONE ENCOUNTER
Pt called and wanted to speak to the provider. Pt stated that his job now needs some form filled out which he is going to bring by after work but he wanted to speak to the provider about a couple of the questions.  He also wanted to let the provider know that his fasting blood sugar was 246 yesterday

## 2022-07-06 ENCOUNTER — HOSPITAL ENCOUNTER (EMERGENCY)
Age: 32
Discharge: HOME | End: 2022-07-06
Attending: EMERGENCY MEDICINE
Payer: COMMERCIAL

## 2022-07-06 ENCOUNTER — NURSE TRIAGE (OUTPATIENT)
Dept: OTHER | Facility: CLINIC | Age: 32
End: 2022-07-06

## 2022-07-06 VITALS
OXYGEN SATURATION: 100 % | HEART RATE: 78 BPM | DIASTOLIC BLOOD PRESSURE: 103 MMHG | WEIGHT: 294.09 LBS | RESPIRATION RATE: 18 BRPM | SYSTOLIC BLOOD PRESSURE: 148 MMHG | TEMPERATURE: 98.1 F | HEIGHT: 74 IN | BODY MASS INDEX: 37.74 KG/M2

## 2022-07-06 DIAGNOSIS — R53.83 FATIGUE, UNSPECIFIED TYPE: ICD-10-CM

## 2022-07-06 DIAGNOSIS — R52 BODY ACHES: ICD-10-CM

## 2022-07-06 DIAGNOSIS — R09.81 SINUS CONGESTION: ICD-10-CM

## 2022-07-06 DIAGNOSIS — Z20.822 PERSON UNDER INVESTIGATION FOR COVID-19: Primary | ICD-10-CM

## 2022-07-06 DIAGNOSIS — N39.0 ACUTE UTI: ICD-10-CM

## 2022-07-06 LAB
ALBUMIN SERPL-MCNC: 3.9 G/DL (ref 3.5–5)
ALBUMIN/GLOB SERPL: 1.1 {RATIO} (ref 1.1–2.2)
ALP SERPL-CCNC: 84 U/L (ref 45–117)
ALT SERPL-CCNC: 33 U/L (ref 12–78)
ANION GAP SERPL CALC-SCNC: 4 MMOL/L (ref 5–15)
APPEARANCE UR: CLEAR
AST SERPL-CCNC: 17 U/L (ref 15–37)
ATRIAL RATE: 72 BPM
BACTERIA URNS QL MICRO: NEGATIVE /HPF
BASOPHILS # BLD: 0.1 K/UL (ref 0–0.1)
BASOPHILS NFR BLD: 1 % (ref 0–1)
BILIRUB SERPL-MCNC: 0.6 MG/DL (ref 0.2–1)
BILIRUB UR QL: NEGATIVE
BUN SERPL-MCNC: 10 MG/DL (ref 6–20)
BUN/CREAT SERPL: 10 (ref 12–20)
CALCIUM SERPL-MCNC: 9.1 MG/DL (ref 8.5–10.1)
CALCULATED P AXIS, ECG09: 42 DEGREES
CALCULATED R AXIS, ECG10: 46 DEGREES
CALCULATED T AXIS, ECG11: 49 DEGREES
CHLORIDE SERPL-SCNC: 109 MMOL/L (ref 97–108)
CO2 SERPL-SCNC: 28 MMOL/L (ref 21–32)
COLOR UR: ABNORMAL
CREAT SERPL-MCNC: 0.96 MG/DL (ref 0.7–1.3)
DIAGNOSIS, 93000: NORMAL
DIFFERENTIAL METHOD BLD: ABNORMAL
EOSINOPHIL # BLD: 0.3 K/UL (ref 0–0.4)
EOSINOPHIL NFR BLD: 4 % (ref 0–7)
EPITH CASTS URNS QL MICRO: ABNORMAL /LPF
ERYTHROCYTE [DISTWIDTH] IN BLOOD BY AUTOMATED COUNT: 12.4 % (ref 11.5–14.5)
FLUAV AG NPH QL IA: NEGATIVE
FLUBV AG NOSE QL IA: NEGATIVE
GLOBULIN SER CALC-MCNC: 3.5 G/DL (ref 2–4)
GLUCOSE SERPL-MCNC: 94 MG/DL (ref 65–100)
GLUCOSE UR STRIP.AUTO-MCNC: NEGATIVE MG/DL
HCT VFR BLD AUTO: 40.7 % (ref 36.6–50.3)
HGB BLD-MCNC: 12.6 G/DL (ref 12.1–17)
HGB UR QL STRIP: NEGATIVE
HYALINE CASTS URNS QL MICRO: ABNORMAL /LPF (ref 0–2)
IMM GRANULOCYTES # BLD AUTO: 0 K/UL (ref 0–0.04)
IMM GRANULOCYTES NFR BLD AUTO: 0 % (ref 0–0.5)
KETONES UR QL STRIP.AUTO: NEGATIVE MG/DL
LEUKOCYTE ESTERASE UR QL STRIP.AUTO: ABNORMAL
LYMPHOCYTES # BLD: 3.7 K/UL (ref 0.8–3.5)
LYMPHOCYTES NFR BLD: 44 % (ref 12–49)
MCH RBC QN AUTO: 27 PG (ref 26–34)
MCHC RBC AUTO-ENTMCNC: 31 G/DL (ref 30–36.5)
MCV RBC AUTO: 87.2 FL (ref 80–99)
MONOCYTES # BLD: 0.7 K/UL (ref 0–1)
MONOCYTES NFR BLD: 8 % (ref 5–13)
NEUTS SEG # BLD: 3.7 K/UL (ref 1.8–8)
NEUTS SEG NFR BLD: 43 % (ref 32–75)
NITRITE UR QL STRIP.AUTO: NEGATIVE
NRBC # BLD: 0 K/UL (ref 0–0.01)
NRBC BLD-RTO: 0 PER 100 WBC
P-R INTERVAL, ECG05: 180 MS
PH UR STRIP: 6.5 [PH] (ref 5–8)
PLATELET # BLD AUTO: 322 K/UL (ref 150–400)
PMV BLD AUTO: 9.7 FL (ref 8.9–12.9)
POTASSIUM SERPL-SCNC: 4 MMOL/L (ref 3.5–5.1)
PROT SERPL-MCNC: 7.4 G/DL (ref 6.4–8.2)
PROT UR STRIP-MCNC: NEGATIVE MG/DL
Q-T INTERVAL, ECG07: 402 MS
QRS DURATION, ECG06: 98 MS
QTC CALCULATION (BEZET), ECG08: 440 MS
RBC # BLD AUTO: 4.67 M/UL (ref 4.1–5.7)
RBC #/AREA URNS HPF: ABNORMAL /HPF (ref 0–5)
SODIUM SERPL-SCNC: 141 MMOL/L (ref 136–145)
SP GR UR REFRACTOMETRY: 1.01
UA: UC IF INDICATED,UAUC: ABNORMAL
UROBILINOGEN UR QL STRIP.AUTO: 0.2 EU/DL (ref 0.2–1)
VENTRICULAR RATE, ECG03: 72 BPM
WBC # BLD AUTO: 8.4 K/UL (ref 4.1–11.1)
WBC URNS QL MICRO: ABNORMAL /HPF (ref 0–4)

## 2022-07-06 PROCEDURE — U0005 INFEC AGEN DETEC AMPLI PROBE: HCPCS

## 2022-07-06 PROCEDURE — 87077 CULTURE AEROBIC IDENTIFY: CPT

## 2022-07-06 PROCEDURE — 81001 URINALYSIS AUTO W/SCOPE: CPT

## 2022-07-06 PROCEDURE — 74011250636 HC RX REV CODE- 250/636: Performed by: PHYSICIAN ASSISTANT

## 2022-07-06 PROCEDURE — 85025 COMPLETE CBC W/AUTO DIFF WBC: CPT

## 2022-07-06 PROCEDURE — 87186 SC STD MICRODIL/AGAR DIL: CPT

## 2022-07-06 PROCEDURE — 36415 COLL VENOUS BLD VENIPUNCTURE: CPT

## 2022-07-06 PROCEDURE — 87086 URINE CULTURE/COLONY COUNT: CPT

## 2022-07-06 PROCEDURE — 99284 EMERGENCY DEPT VISIT MOD MDM: CPT

## 2022-07-06 PROCEDURE — 96374 THER/PROPH/DIAG INJ IV PUSH: CPT

## 2022-07-06 PROCEDURE — 87804 INFLUENZA ASSAY W/OPTIC: CPT

## 2022-07-06 PROCEDURE — 80053 COMPREHEN METABOLIC PANEL: CPT

## 2022-07-06 PROCEDURE — 93005 ELECTROCARDIOGRAM TRACING: CPT

## 2022-07-06 RX ORDER — CEPHALEXIN 500 MG/1
500 CAPSULE ORAL 4 TIMES DAILY
Qty: 28 CAPSULE | Refills: 0 | Status: SHIPPED | OUTPATIENT
Start: 2022-07-06 | End: 2022-07-13

## 2022-07-06 RX ORDER — ONDANSETRON 2 MG/ML
4 INJECTION INTRAMUSCULAR; INTRAVENOUS ONCE
Status: COMPLETED | OUTPATIENT
Start: 2022-07-06 | End: 2022-07-06

## 2022-07-06 RX ADMIN — ONDANSETRON 4 MG: 2 INJECTION INTRAMUSCULAR; INTRAVENOUS at 15:36

## 2022-07-06 RX ADMIN — SODIUM CHLORIDE 1000 ML: 9 INJECTION, SOLUTION INTRAVENOUS at 15:27

## 2022-07-06 NOTE — DISCHARGE INSTRUCTIONS
Thank You! It was a pleasure taking care of you in our Emergency Department today. We know that when you come to [address] [place] [address], you are entrusting us with your health, comfort, and safety. Our clinicians honor that trust, and truly appreciate the opportunity to care for you and your loved ones. We also value your feedback. If you receive a survey about your Emergency Department experience today, please fill it out. We care about our patients' feedback, and we listen to what you have to say. Thank you.          ____________________________________________________________________  I have included a copy of your lab results and/or radiologic studies from today's visit so you can have them easily available at your follow-up visit. We hope you feel better and please do not hesitate to contact the ED if you have any questions at all!     Recent Results (from the past 12 hour(s))   EKG, 12 LEAD, INITIAL    Collection Time: 07/06/22  1:55 PM   Result Value Ref Range    Ventricular Rate 72 BPM    Atrial Rate 72 BPM    P-R Interval 180 ms    QRS Duration 98 ms    Q-T Interval 402 ms    QTC Calculation (Bezet) 440 ms    Calculated P Axis 42 degrees    Calculated R Axis 46 degrees    Calculated T Axis 49 degrees    Diagnosis       Normal sinus rhythm  No previous ECGs available  Confirmed by Power Karimi ([id number]) on 7/6/2022 3:10:38 PM     URINALYSIS W/ REFLEX CULTURE    Collection Time: 07/06/22  2:05 PM    Specimen: Urine   Result Value Ref Range    Color YELLOW/STRAW      Appearance CLEAR CLEAR      Specific gravity 1.011      pH (UA) 6.5 5.0 - 8.0      Protein Negative NEG mg/dL    Glucose Negative NEG mg/dL    Ketone Negative NEG mg/dL    Bilirubin Negative NEG      Blood Negative NEG      Urobilinogen 0.2 0.2 - 1.0 EU/dL    Nitrites Negative NEG      Leukocyte Esterase TRACE (A) NEG      UA:UC IF INDICATED CULTURE NOT INDICATED BY UA RESULT CNI      WBC 0-4 0 - 4 /hpf    RBC 0-5 0 - 5 /hpf    Epithelial cells FEW FEW /lpf    Bacteria Negative NEG /hpf    Hyaline cast 0-2 0 - 2 /lpf   CBC WITH AUTOMATED DIFF    Collection Time: 07/06/22  2:05 PM   Result Value Ref Range    WBC 8.4 4.1 - 11.1 K/uL    RBC 4.67 4.10 - 5.70 M/uL    HGB 12.6 12.1 - 17.0 g/dL    HCT 40.7 36.6 - 50.3 %    MCV 87.2 80.0 - 99.0 FL    MCH 27.0 26.0 - 34.0 PG    MCHC 31.0 30.0 - 36.5 g/dL    RDW 12.4 11.5 - 14.5 %    PLATELET 528 879 - 512 K/uL    MPV 9.7 8.9 - 12.9 FL    NRBC 0.0 0  WBC    ABSOLUTE NRBC 0.00 0.00 - 0.01 K/uL    NEUTROPHILS 43 32 - 75 %    LYMPHOCYTES 44 12 - 49 %    MONOCYTES 8 5 - 13 %    EOSINOPHILS 4 0 - 7 %    BASOPHILS 1 0 - 1 %    IMMATURE GRANULOCYTES 0 0.0 - 0.5 %    ABS. NEUTROPHILS 3.7 1.8 - 8.0 K/UL    ABS. LYMPHOCYTES 3.7 (H) 0.8 - 3.5 K/UL    ABS. MONOCYTES 0.7 0.0 - 1.0 K/UL    ABS. EOSINOPHILS 0.3 0.0 - 0.4 K/UL    ABS. BASOPHILS 0.1 0.0 - 0.1 K/UL    ABS. IMM. GRANS. 0.0 0.00 - 0.04 K/UL    DF AUTOMATED     METABOLIC PANEL, COMPREHENSIVE    Collection Time: 07/06/22  2:05 PM   Result Value Ref Range    Sodium 141 136 - 145 mmol/L    Potassium 4.0 3.5 - 5.1 mmol/L    Chloride 109 (H) 97 - 108 mmol/L    CO2 28 21 - 32 mmol/L    Anion gap 4 (L) 5 - 15 mmol/L    Glucose 94 65 - 100 mg/dL    BUN 10 6 - 20 MG/DL    Creatinine 0.96 0.70 - 1.30 MG/DL    BUN/Creatinine ratio 10 (L) 12 - 20      GFR est AA >60 >60 ml/min/1.73m2    GFR est non-AA >60 >60 ml/min/1.73m2    Calcium 9.1 8.5 - 10.1 MG/DL    Bilirubin, total 0.6 0.2 - 1.0 MG/DL    ALT (SGPT) 33 12 - 78 U/L    AST (SGOT) 17 15 - 37 U/L    Alk.  phosphatase 84 45 - 117 U/L    Protein, total 7.4 6.4 - 8.2 g/dL    Albumin 3.9 3.5 - 5.0 g/dL    Globulin 3.5 2.0 - 4.0 g/dL    A-G Ratio 1.1 1.1 - 2.2     INFLUENZA A+B VIRAL AGS    Collection Time: 07/06/22  3:22 PM   Result Value Ref Range    Influenza A Antigen Negative NEG      Influenza B Antigen Negative NEG         No orders to display     CT Results  (Last 48 hours)      None The exam and treatment you received in the Emergency Department were for an urgent problem and are not intended as complete care. It is important that you follow up with a doctor, nurse practitioner, or physician assistant for ongoing care. If your symptoms become worse or you do not improve as expected and you are unable to reach your usual health care provider, you should return to the Emergency Department. We are available 24 hours a day. Please take your discharge instructions with you when you go to your follow-up appointment. If a prescription has been provided, please have it filled as soon as possible to prevent a delay in treatment. Read the entire medication instruction sheet provided to you by the pharmacy. If you have any questions or reservations about taking the medication due to side effects or interactions with other medications, please call your primary care physician or contact the ER to speak with the charge nurse. Please make an appointment with your family doctor or the physician you were referred to for follow-up of this visit as instructed on your discharge paperwork. Return to the ER if you are unable to be seen or if you are unable to be seen in a timely manner. If you have any problem arranging the follow-up visit, contact the Emergency Department immediately.

## 2022-07-06 NOTE — ED PROVIDER NOTES
EMERGENCY DEPARTMENT HISTORY AND PHYSICAL EXAM      Date: 7/6/2022  Patient Name: Michelle Florence    History of Presenting Illness     Chief Complaint   Patient presents with    Fatigue     Reports new onset of fatigue, nausea, and diarrhea x 2 days. Pt was at [facility] all day on 7/4, he called his PCP about symptoms and was referred to ER for heat exhaustion. History Provided By: Patient    HPI: Michelle Florence, 27 y.o. male with significant PMHx for asthma, presents ambulatory to the ED with cc of mild, intermittent nausea, sinus congestion, fatigue, diffuse generalized body aches, gradual onset frontal headache, and loose stools for the past 2 days. States he was at [facility] on 7/4, and [place] he left the park he started noticing he had some body aches, gradual onset frontal headache, and nausea. States symptoms have persisted, he called his primary care provider today and they advised him to come to the ER for evaluation. States he is taken 2 at home rapid COVID-19 test that were negative. denies any known sick contacts. Has had some chills and generalized body aches, but denies measured fevers. He does have a history of UTIs, and a day or 2 before going to [facility], he had an episode of dysuria, but none since. Denies any urinary urgency, frequency, hematuria. Denies any dark colored urine. Denies penile pain or discharge, testicular pain or swelling. Denies concern for STDs or STIs. Tolerating PO well. Denies difficulty ambulating or changes in gait. Has been taking allergy medications for runny nose/sinus congestion with minimal improvement. Denies headache, neck stiffness, sore throat, dizziness, CP, SOB, abdominal pain, blood in urine or stool, back pain, rashes or LOC. There are no other complaints, changes, or physical findings at this time. PCP: Kiko Vega PA-C    No current facility-administered medications on file prior to encounter.      Current Outpatient Medications on File Prior to Encounter   Medication Sig Dispense Refill    topiramate (TOPAMAX) 50 mg tablet Take 1 Tablet by mouth two (2) times a day. 60 Tablet 5    multivitamin (ONE A DAY) tablet Take 1 Tablet by mouth daily.  Lactobacillus acidophilus (PROBIOTIC PO) Take  by mouth.  fluticasone propionate (Flonase Allergy Relief) 50 mcg/actuation nasal spray 2 Sprays by Both Nostrils route daily.  albuterol (PROVENTIL HFA, VENTOLIN HFA, PROAIR HFA) 90 mcg/actuation inhaler Take 1-2 Puffs by inhalation every four (4) hours as needed for Wheezing for up to 90 days. 18 g 0    fexofenadine (ALLEGRA) 60 mg tablet Take 1 Tablet by mouth daily. 90 Tablet 3    montelukast (SINGULAIR) 10 mg tablet Take 1 Tablet by mouth daily. 30 Tablet 5    Omeprazole delayed release (PRILOSEC D/R) 20 mg tablet Take 1 Tablet by mouth daily. 90 Tablet 3    metFORMIN (GLUCOPHAGE) 500 mg tablet Take 1 Tablet by mouth daily (with dinner). 30 Tablet 5       Past History     Past Medical History:  Past Medical History:   Diagnosis Date    Brain aneurysm 2017       Past Surgical History:  Past Surgical History:   Procedure Laterality Date    HX OTHER SURGICAL  2017    surgery for brain aneurysm       Family History:  Family History   Problem Relation Age of Onset    Diabetes Mother     Hypertension Mother    Juan Alberto Bower Elevated Lipids Mother     No Known Problems Father        Social History:  Social History     Tobacco Use    Smoking status: Current Every Day Smoker     Packs/day: 1.00     Types: Cigarettes    Smokeless tobacco: Never Used   Vaping Use    Vaping Use: Former   Substance Use Topics    Alcohol use: Yes     Comment: occasional     Drug use: Not Currently     Types: Cocaine       Allergies:  No Known Allergies      Review of Systems   Review of Systems   Constitutional: Positive for chills. Negative for appetite change and fever. HENT: Negative for congestion. Eyes: Negative for pain.    Respiratory: Negative for cough and shortness of breath. Cardiovascular: Negative for chest pain. Gastrointestinal: Positive for diarrhea and nausea. Negative for abdominal pain, constipation and vomiting. Genitourinary: Positive for dysuria. Negative for decreased urine volume, difficulty urinating, enuresis, flank pain, frequency, genital sores, hematuria, penile discharge, penile pain, penile swelling, testicular pain and urgency. Musculoskeletal: Positive for myalgias. Negative for back pain and neck stiffness. Skin: Negative for rash. Neurological: Negative for syncope and headaches. All other systems reviewed and are negative. Physical Exam   Physical Exam  Vitals and nursing note reviewed. Constitutional:       General: He is not in acute distress. Appearance: Normal appearance. He is not ill-appearing or toxic-appearing. Comments: 27 y.o. male   HENT:      Head: Normocephalic and atraumatic. Right Ear: External ear normal.      Left Ear: External ear normal.      Nose: Congestion and rhinorrhea present. Mouth/Throat:      Mouth: Mucous membranes are moist.      Pharynx: Oropharynx is clear. Eyes:      Extraocular Movements: Extraocular movements intact. Conjunctiva/sclera: Conjunctivae normal.   Cardiovascular:      Rate and Rhythm: Normal rate and regular rhythm. Pulses: Normal pulses. Heart sounds: Normal heart sounds. No murmur heard. Pulmonary:      Effort: Pulmonary effort is normal. No respiratory distress. Breath sounds: Normal breath sounds. No wheezing. Abdominal:      General: There is no distension. Palpations: Abdomen is soft. There is no mass. Tenderness: There is no abdominal tenderness. There is no right CVA tenderness, left CVA tenderness or guarding. Musculoskeletal:         General: Normal range of motion. Cervical back: Normal range of motion. Right lower leg: No edema. Left lower leg: No edema.    Skin: General: Skin is warm and dry. Neurological:      General: No focal deficit present. Mental Status: He is alert and oriented to person, place, and time.    Psychiatric:         Mood and Affect: Mood normal.         Behavior: Behavior normal.         Diagnostic Study Results     Labs -     Recent Results (from the past 12 hour(s))   EKG, 12 LEAD, INITIAL    Collection Time: 07/06/22  1:55 PM   Result Value Ref Range    Ventricular Rate 72 BPM    Atrial Rate 72 BPM    P-R Interval 180 ms    QRS Duration 98 ms    Q-T Interval 402 ms    QTC Calculation (Bezet) 440 ms    Calculated P Axis 42 degrees    Calculated R Axis 46 degrees    Calculated T Axis 49 degrees    Diagnosis       Normal sinus rhythm  No previous ECGs available  Confirmed by Jhonny Braswell ([id number]) on 7/6/2022 3:10:38 PM     URINALYSIS W/ REFLEX CULTURE    Collection Time: 07/06/22  2:05 PM    Specimen: Urine   Result Value Ref Range    Color YELLOW/STRAW      Appearance CLEAR CLEAR      Specific gravity 1.011      pH (UA) 6.5 5.0 - 8.0      Protein Negative NEG mg/dL    Glucose Negative NEG mg/dL    Ketone Negative NEG mg/dL    Bilirubin Negative NEG      Blood Negative NEG      Urobilinogen 0.2 0.2 - 1.0 EU/dL    Nitrites Negative NEG      Leukocyte Esterase TRACE (A) NEG      UA:UC IF INDICATED CULTURE NOT INDICATED BY UA RESULT CNI      WBC 0-4 0 - 4 /hpf    RBC 0-5 0 - 5 /hpf    Epithelial cells FEW FEW /lpf    Bacteria Negative NEG /hpf    Hyaline cast 0-2 0 - 2 /lpf   CBC WITH AUTOMATED DIFF    Collection Time: 07/06/22  2:05 PM   Result Value Ref Range    WBC 8.4 4.1 - 11.1 K/uL    RBC 4.67 4.10 - 5.70 M/uL    HGB 12.6 12.1 - 17.0 g/dL    HCT 40.7 36.6 - 50.3 %    MCV 87.2 80.0 - 99.0 FL    MCH 27.0 26.0 - 34.0 PG    MCHC 31.0 30.0 - 36.5 g/dL    RDW 12.4 11.5 - 14.5 %    PLATELET 384 027 - 225 K/uL    MPV 9.7 8.9 - 12.9 FL    NRBC 0.0 0  WBC    ABSOLUTE NRBC 0.00 0.00 - 0.01 K/uL    NEUTROPHILS 43 32 - 75 %    LYMPHOCYTES 44 12 - 49 %    MONOCYTES 8 5 - 13 %    EOSINOPHILS 4 0 - 7 %    BASOPHILS 1 0 - 1 %    IMMATURE GRANULOCYTES 0 0.0 - 0.5 %    ABS. NEUTROPHILS 3.7 1.8 - 8.0 K/UL    ABS. LYMPHOCYTES 3.7 (H) 0.8 - 3.5 K/UL    ABS. MONOCYTES 0.7 0.0 - 1.0 K/UL    ABS. EOSINOPHILS 0.3 0.0 - 0.4 K/UL    ABS. BASOPHILS 0.1 0.0 - 0.1 K/UL    ABS. IMM. GRANS. 0.0 0.00 - 0.04 K/UL    DF AUTOMATED     METABOLIC PANEL, COMPREHENSIVE    Collection Time: 07/06/22  2:05 PM   Result Value Ref Range    Sodium 141 136 - 145 mmol/L    Potassium 4.0 3.5 - 5.1 mmol/L    Chloride 109 (H) 97 - 108 mmol/L    CO2 28 21 - 32 mmol/L    Anion gap 4 (L) 5 - 15 mmol/L    Glucose 94 65 - 100 mg/dL    BUN 10 6 - 20 MG/DL    Creatinine 0.96 0.70 - 1.30 MG/DL    BUN/Creatinine ratio 10 (L) 12 - 20      GFR est AA >60 >60 ml/min/1.73m2    GFR est non-AA >60 >60 ml/min/1.73m2    Calcium 9.1 8.5 - 10.1 MG/DL    Bilirubin, total 0.6 0.2 - 1.0 MG/DL    ALT (SGPT) 33 12 - 78 U/L    AST (SGOT) 17 15 - 37 U/L    Alk. phosphatase 84 45 - 117 U/L    Protein, total 7.4 6.4 - 8.2 g/dL    Albumin 3.9 3.5 - 5.0 g/dL    Globulin 3.5 2.0 - 4.0 g/dL    A-G Ratio 1.1 1.1 - 2.2     INFLUENZA A+B VIRAL AGS    Collection Time: 07/06/22  3:22 PM   Result Value Ref Range    Influenza A Antigen Negative NEG      Influenza B Antigen Negative NEG         Radiologic Studies -   No orders to display     CT Results  (Last 48 hours)    None        CXR Results  (Last 48 hours)    None            Medical Decision Making   I am the first provider for this patient. I reviewed the vital signs, available nursing notes, past medical history, past surgical history, family history and social history. Vital Signs-Reviewed the patient's vital signs.   Patient Vitals for the past 12 hrs:   Temp Pulse Resp BP SpO2   07/06/22 1349 98.1 °F (36.7 °C) 78 18 (!) 148/103 100 %       Records Reviewed: Nursing Notes, Old Medical Records and Previous Laboratory Studies    Provider Notes (Medical Decision Making): Patient presents ED as noted above. Afebrile, nontoxic-appearing. Benign abdominal exam.  No hypoxia or increased work of breathing, breath sounds clear throughout. IV given here in the ED. Suspect this is likely a viral syndrome, COVID-19 pending. Discussed pending results, diagnosis, myChart access, and information packet provided at discharge   Work-up reassuring. States he has had UTIs in the past, cultures reviewed. Will place on keflex; culture sent. (denies concern for STDs/STIs). Do not suspect rhabdomyolysis, meningitis, sepsis, acute surgical intra-abdominal etiology, or other emergent conditions going further evaluation/management acutely here at this time. Shared decision making performed and care plan created together, discussed results, diagnosis and treatment plan. Counseled symptomatic management techniques. PCP follow-up. Verbal return precautions advised. Patient verbalizes understanding and agreement of current plan of care. ED Course:   Initial assessment performed. The patients presenting problems have been discussed, and they are in agreement with the care plan formulated and outlined with them. I have encouraged them to ask questions as they arise throughout their visit. Critical Care Time: None    Disposition:  Discharge     PLAN:  1. Discharge Medication List as of 7/6/2022  4:38 PM      START taking these medications    Details   cephALEXin (Keflex) 500 mg capsule Take 1 Capsule by mouth four (4) times daily for 7 days. , Normal, Disp-28 Capsule, R-0         CONTINUE these medications which have NOT CHANGED    Details   topiramate (TOPAMAX) 50 mg tablet Take 1 Tablet by mouth two (2) times a day., Normal, Disp-60 Tablet, R-5      multivitamin (ONE A DAY) tablet Take 1 Tablet by mouth daily. , Historical Med      Lactobacillus acidophilus (PROBIOTIC PO) Take  by mouth., Historical Med      fluticasone propionate (Flonase Allergy Relief) 50 mcg/actuation nasal spray 2 Sprays by Both Nostrils route daily. , Historical Med      albuterol (PROVENTIL HFA, VENTOLIN HFA, PROAIR HFA) 90 mcg/actuation inhaler Take 1-2 Puffs by inhalation every four (4) hours as needed for Wheezing for up to 90 days. , Normal, Disp-18 g, R-0      fexofenadine (ALLEGRA) 60 mg tablet Take 1 Tablet by mouth daily. , Normal, Disp-90 Tablet, R-3      montelukast (SINGULAIR) 10 mg tablet Take 1 Tablet by mouth daily. , Normal, Disp-30 Tablet, R-5      Omeprazole delayed release (PRILOSEC D/R) 20 mg tablet Take 1 Tablet by mouth daily. , Normal, Disp-90 Tablet, R-3      metFORMIN (GLUCOPHAGE) 500 mg tablet Take 1 Tablet by mouth daily (with dinner). , Normal, Disp-30 Tablet, R-5           2. Follow-up Information     Follow up With Specialties Details Why Contact Info    [facility] EMERGENCY DEPT Emergency Medicine  As needed, If symptoms worsen [address] [facility]    [facility] , PA-C Physician Assistant In 1 week  [address] [place], [place] [id number]  [phone]          Return to ED if worse     Diagnosis     Clinical Impression:   1. Person under investigation for COVID-19    2. Acute UTI    3. Sinus congestion    4. Body aches    5. Fatigue, unspecified type          Please note that this dictation was completed with [employer], the [email or web address] voice recognition software. Quite often unanticipated grammatical, syntax, homophones, and other interpretive errors are inadvertently transcribed by the computer software. Please disregards these errors. Please excuse any errors that have escaped final proofreading.

## 2022-07-06 NOTE — Clinical Note
[address]  [facility] EMERGENCY DEPT  [address] [place] [address]  Syd Santana [id number] [phone]    Work/School Note    Date: 7/6/2022    To Whom It May concern:    Lizeth Banuelos was seen and treated today in the emergency room by the following provider(s):  Attending Provider: Eliseo Cunha MD  Physician Assistant: Finn Harrison. Lizeth Banuelos is excused from work/school on 7/6/2022 through 7/8/2022. He is medically clear to return to work/school on 7/9/2022.          Sincerely,          Marely Ann, PA

## 2022-07-06 NOTE — TELEPHONE ENCOUNTER
Received call from JOSÉ MIGUEL Armstrong 18 at [facility] with Red Flag Complaint. Subjective: Caller states \"I believe I was in the sun too long on 4th. Was out in sun for 8 hours all day long. Felt nauseas and achy before leaving. Back pain and neck pain, sunburnt (no blistering), nausea, fatigue, chills, felt feverish, slept all day and night yesterday and today still tired. Took 2 Covid tests and they are negative and I've been experiencing dizziness. Current Symptoms: see above    Onset: 2 days ago; unchanged    Associated Symptoms: increased sleepiness    Pain Severity: 6/10; aching; constant    Temperature: unsure by unknown method    What has been tried: Saranya Cap, Motrin    LMP: NA Pregnant: NA    Recommended disposition: Go to ED/UCC Now (Or to Office with PCP Approval)Unable to reach PCP office after 10 minutes on hold. Advised pt to go to ED. Care advice provided, patient verbalizes understanding; denies any other questions or concerns; instructed to call back for any new or worsening symptoms. Patient/caller agrees to proceed to nearest Emergency Department    Attention Provider: Thank you for allowing me to participate in the care of your patient. The patient was connected to triage in response to information provided to the [facility]. Please do not respond through this encounter as the response is not directed to a shared pool.         Reason for Disposition   Heat stroke, sunstroke or heat exhaustion suspected   Dizziness, fever, or muscle cramps persist after 2 hours of oral fluids and rest    Protocols used: SUNBURN-ADULT-OH, HEAT EXPOSURE (HEAT EXHAUSTION AND HEAT STROKE)-ADULT-OH

## 2022-07-06 NOTE — ED NOTES
RADHA Sky at bedside to review DC plan. DC papers reviewed and in hand, pt verbalized understanding. Ambulatory out of ER without assist, no acute distress noted.

## 2022-07-06 NOTE — Clinical Note
[address]  [facility] EMERGENCY DEPT  [address] [facility]  [place] [address]  [phone]    Work/School Note    Date: 7/6/2022     To Whom It May concern:    Christiano Dc was evaluated by the following provider(s):  Attending Provider: Jadon Burnett MD  Physician Assistant: Abram Dejesus, [address] [place] [address] virus is suspected. Per the CDC guidelines we recommend home isolation until the following conditions are all met:    1. At least five days have passed since symptoms first appeared and/or had a close exposure,   2. After home isolation for five days, wearing a mask around others for the next five days,  3. At least 24 have passed since last fever without the use of fever-reducing medications and  4.  Symptoms (eg cough, shortness of breath) have improved      Sincerely,          Lorren Kanner, PA

## 2022-07-07 ENCOUNTER — PATIENT OUTREACH (OUTPATIENT)
Dept: CASE MANAGEMENT | Age: 32
End: 2022-07-07

## 2022-07-07 LAB
SARS-COV-2, XPLCVT: NOT DETECTED
SOURCE, COVRS: NORMAL

## 2022-07-07 NOTE — PROGRESS NOTES
Patient contacted regarding COVID-19 risk. Discussed COVID-19 related testing which was available at this time. Test results were pending. Patient informed of results, if available? no.     LPN Care Coordinator contacted the patient by telephone to perform post discharge assessment. Call within 2 business days of discharge: Yes Verified name and  with patient as identifiers. Provided introduction to self, and explanation of the CTN/ACM role, and reason for call due to risk factors for infection and/or exposure to COVID-19. Symptoms reviewed with patient who verbalized the following symptoms: no worsening symptoms      Due to no new or worsening symptoms encounter was not routed to provider for escalation. Discussed follow-up appointments. If no appointment was previously scheduled, appointment scheduling offered:  no. [facility] follow up appointment(s):   Future Appointments   Date Time Provider Gracie Villalpando   2022  3:30 PM Joanna Spivey PA-C [facility] BS [facility]     Non-BS follow up appointment(s): n/a    Interventions to address risk factors: Obtained and reviewed discharge summary and/or continuity of care documents     Advance Care Planning:   Does patient have an Advance Directive: not on file; education provided. Educated patient about risk for severe COVID-19 due to risk factors according to CDC guidelines. LPN CC reviewed discharge instructions, medical action plan and red flag symptoms with the patient who verbalized understanding. Discussed COVID vaccination status: yes. Education provided on COVID-19 vaccination as appropriate. Discussed exposure protocols and quarantine with CDC Guidelines. Patient was given an opportunity to verbalize any questions and concerns and agrees to contact LPN CC or health care provider for questions related to their healthcare.     Reviewed and educated patient on any new and changed medications related to discharge diagnosis     Was patient discharged with a pulse oximeter? no    LPN CC provided contact information. No further follow-up call identified based on severity of symptoms and risk factors.

## 2022-07-09 LAB
BACTERIA SPEC CULT: ABNORMAL
CC UR VC: ABNORMAL
SERVICE CMNT-IMP: ABNORMAL

## 2022-07-11 NOTE — PROGRESS NOTES
No susceptibilities for first generation cephalosporins. Called to reassess symptoms, left HIPAA compliant VM.

## 2022-07-12 NOTE — PROGRESS NOTES
Spoke with patient who states he is feeling much better. Denies dysuria, hematuria, abdominal pain, back pain or fevers. Discussed urine culture results with patient. States he is scheduled to see his PCP on Wednesday and they have outpatient orders for him to get a urinalysis and culture at this time. Verbal return precautions advised.

## 2022-07-13 ENCOUNTER — VIRTUAL VISIT (OUTPATIENT)
Dept: INTERNAL MEDICINE CLINIC | Age: 32
End: 2022-07-13
Payer: COMMERCIAL

## 2022-07-13 DIAGNOSIS — M79.10 MYALGIA: ICD-10-CM

## 2022-07-13 DIAGNOSIS — A63.0 ANAL WARTS: ICD-10-CM

## 2022-07-13 DIAGNOSIS — J45.20 MILD INTERMITTENT ASTHMA WITHOUT COMPLICATION: ICD-10-CM

## 2022-07-13 DIAGNOSIS — R07.9 CHEST PAIN, UNSPECIFIED TYPE: ICD-10-CM

## 2022-07-13 DIAGNOSIS — I67.1 CEREBRAL ANEURYSM: ICD-10-CM

## 2022-07-13 DIAGNOSIS — Z83.3 FAMILY HISTORY OF DIABETES MELLITUS: ICD-10-CM

## 2022-07-13 DIAGNOSIS — R20.2 TINGLING OF BOTH UPPER EXTREMITIES: ICD-10-CM

## 2022-07-13 DIAGNOSIS — R20.2 TINGLING OF BOTH FEET: Primary | ICD-10-CM

## 2022-07-13 LAB
APPEARANCE UR: CLEAR
BACTERIA #/AREA URNS HPF: NORMAL /[HPF]
BILIRUB UR QL STRIP: NEGATIVE
CASTS URNS QL MICRO: NORMAL /LPF
COLOR UR: YELLOW
EPI CELLS #/AREA URNS HPF: NORMAL /HPF (ref 0–10)
GLUCOSE UR QL STRIP: NEGATIVE
HGB UR QL STRIP: NEGATIVE
KETONES UR QL STRIP: NEGATIVE
LEUKOCYTE ESTERASE UR QL STRIP: NEGATIVE
MICRO URNS: NORMAL
MICRO URNS: NORMAL
NITRITE UR QL STRIP: NEGATIVE
PH UR STRIP: 6.5 [PH] (ref 5–7.5)
PROT UR QL STRIP: NEGATIVE
RBC #/AREA URNS HPF: NORMAL /HPF (ref 0–2)
SP GR UR STRIP: 1.02 (ref 1–1.03)
URINALYSIS REFLEX, 377202: NORMAL
UROBILINOGEN UR STRIP-MCNC: 1 MG/DL (ref 0.2–1)
WBC #/AREA URNS HPF: NORMAL /HPF (ref 0–5)

## 2022-07-13 PROCEDURE — 99214 OFFICE O/P EST MOD 30 MIN: CPT | Performed by: PHYSICIAN ASSISTANT

## 2022-07-13 NOTE — PROGRESS NOTES
Garcia Burns is a 27 y.o. male who was seen by synchronous (real-time) audio-video technology on 7/13/2022 for Numbness        Assessment & Plan:   Diagnoses and all orders for this visit:    1. Tingling of both feet  -     EMG TWO EXTREMITIES LOWER; Future  -     EMG TWO EXTREMITIES UPPER; Future    2. Tingling of both upper extremities  -     EMG TWO EXTREMITIES LOWER; Future  -     EMG TWO EXTREMITIES UPPER; Future    3. Myalgia  -     EMG TWO EXTREMITIES LOWER; Future  -     EMG TWO EXTREMITIES UPPER; Future  Send for EMG EVAL of highly suspicious neuropathy symptoms in setting of no DM. 4. Anal warts  -     REFERRAL TO DERMATOLOGY  Requested treatment, will look into cream, send to derm if not available  5. Chest pain, unspecified type  -     REFERRAL TO CARDIOLOGY  Send to Cardiology closer to home at [facility] for chest pains and eval  6. Mild intermittent asthma without complication  Paperwork completed for work at home  7. Cerebral aneurysm  Needs to set up neurosurgery appt  8. Family history of diabetes mellitus  A1C normal, though still suspect neuropathy    The complexity of medical decision making for this visit is moderate     I spent at least 30 minutes on this visit with this established patient. 712  Subjective:   Patient presents over mychart virtual visit for follow up. Labs showed normal A1C and irregular fasting sugar. He has strong family hx of DM. He has also been having more regular muscle cramping and aching pains in his arms and legs with tingling sensations and numbness in his BL feet and finger tips. He reports being seen in ED for heat exhaustion and being found to have positive urine culture again. He was given IV fluid and discharged. He admits he has had intermittent chest pains and notes he has had some vision changes when he sneezes. He is still waiting to get neurosurgery appt. He does not admit to any SOB at this time or palpitations.    He started new job with [employer] and we have filled out form for his high risk status so he can work at home with hx of cerebral aneurysm and asthma. He notes because they work banking hours he has not been able to set up any of his appointments. He is still waiting to see ENT for chronic sinusitis and notes her persistently has had problems there and some dizziness. He also needs to set up endocrinology and sleep medicine. Prior to Admission medications    Medication Sig Start Date End Date Taking? Authorizing Provider   cephALEXin (Keflex) 500 mg capsule Take 1 Capsule by mouth four (4) times daily for 7 days. 7/6/22 7/13/22 Yes RADHA Henderson   topiramate (TOPAMAX) 50 mg tablet Take 1 Tablet by mouth two (2) times a day. 4/29/22  Yes Mickiel FlatALBINA fagan   Lactobacillus acidophilus (PROBIOTIC PO) Take  by mouth. Yes Provider, Historical   fluticasone propionate (Flonase Allergy Relief) 50 mcg/actuation nasal spray 2 Sprays by Both Nostrils route daily. Yes Provider, Historical   albuterol (PROVENTIL HFA, VENTOLIN HFA, PROAIR HFA) 90 mcg/actuation inhaler Take 1-2 Puffs by inhalation every four (4) hours as needed for Wheezing for up to 90 days. 4/14/22 7/13/22 Yes Mickiel Flattery, PA-C   fexofenadine (ALLEGRA) 60 mg tablet Take 1 Tablet by mouth daily. 4/14/22  Yes Mickiel Flattery, PA-C   montelukast (SINGULAIR) 10 mg tablet Take 1 Tablet by mouth daily. 4/14/22  Yes Mickiel Flattery, PA-C   Omeprazole delayed release (PRILOSEC D/R) 20 mg tablet Take 1 Tablet by mouth daily. 4/14/22  Yes Mickiel Flattery, PA-C   multivitamin (ONE A DAY) tablet Take 1 Tablet by mouth daily. Patient not taking: Reported on 7/13/2022    Provider, Historical   metFORMIN (GLUCOPHAGE) 500 mg tablet Take 1 Tablet by mouth daily (with dinner).   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    Mild intermittent asthma without complication K16.90    Hiatal hernia K44.9    Gastroesophageal reflux disease without esophagitis K21.9    Abnormal blood sugar R73.09    Low testosterone R79.89    Seasonal allergic rhinitis due to pollen J30.1     Patient Active Problem List    Diagnosis Date Noted    Abnormal blood sugar 04/29/2022    Low testosterone 04/29/2022    Seasonal allergic rhinitis due to pollen 04/29/2022    Non-seasonal allergic rhinitis 04/14/2022    Cerebral aneurysm 04/14/2022    Morbid obesity ([facility] [address] 04/14/2022    Snoring 04/14/2022    Family history of diabetes mellitus 04/14/2022    Orgasmic dysfunction 04/14/2022    Mild intermittent asthma without complication 12/90/2634    Hiatal hernia 04/14/2022    Gastroesophageal reflux disease without esophagitis 04/14/2022     Current Outpatient Medications   Medication Sig Dispense Refill    cephALEXin (Keflex) 500 mg capsule Take 1 Capsule by mouth four (4) times daily for 7 days. 28 Capsule 0    topiramate (TOPAMAX) 50 mg tablet Take 1 Tablet by mouth two (2) times a day. 60 Tablet 5    Lactobacillus acidophilus (PROBIOTIC PO) Take  by mouth.  fluticasone propionate (Flonase Allergy Relief) 50 mcg/actuation nasal spray 2 Sprays by Both Nostrils route daily.  albuterol (PROVENTIL HFA, VENTOLIN HFA, PROAIR HFA) 90 mcg/actuation inhaler Take 1-2 Puffs by inhalation every four (4) hours as needed for Wheezing for up to 90 days. 18 g 0    fexofenadine (ALLEGRA) 60 mg tablet Take 1 Tablet by mouth daily. 90 Tablet 3    montelukast (SINGULAIR) 10 mg tablet Take 1 Tablet by mouth daily. 30 Tablet 5    Omeprazole delayed release (PRILOSEC D/R) 20 mg tablet Take 1 Tablet by mouth daily. 90 Tablet 3    multivitamin (ONE A DAY) tablet Take 1 Tablet by mouth daily.  (Patient not taking: Reported on 7/13/2022)  metFORMIN (GLUCOPHAGE) 500 mg tablet Take 1 Tablet by mouth daily (with dinner). (Patient not taking: Reported on 7/13/2022) 30 Tablet 5     No Known Allergies  Past Medical History:   Diagnosis Date    Brain aneurysm 2017     Past Surgical History:   Procedure Laterality Date    HX OTHER SURGICAL  2017    surgery for brain aneurysm     Family History   Problem Relation Age of Onset    Diabetes Mother     Hypertension Mother    Gloriajean Seeds Elevated Lipids Mother     No Known Problems Father      Social History     Tobacco Use    Smoking status: Current Every Day Smoker     Packs/day: 1.00     Types: Cigarettes    Smokeless tobacco: Never Used   Substance Use Topics    Alcohol use: Yes     Comment: occasional        Review of Systems   Constitutional: Negative for chills, fever, malaise/fatigue and weight loss. HENT: Positive for sinus pain. Respiratory: Negative for cough, shortness of breath and wheezing. Cardiovascular: Negative for chest pain, palpitations and leg swelling. Gastrointestinal: Negative for abdominal pain, blood in stool, constipation, diarrhea, heartburn, melena, nausea and vomiting. Genitourinary: Negative for dysuria and frequency. Musculoskeletal: Positive for myalgias. Skin: Negative for rash. Neurological: Positive for dizziness and sensory change. Negative for weakness and headaches. Endo/Heme/Allergies: Does not bruise/bleed easily. Psychiatric/Behavioral: Negative for depression. All other systems reviewed and are negative.       Objective:     Patient-Reported Vitals 4/29/2022   Patient-Reported Weight 304      General: alert, cooperative, no distress   Mental  status: normal mood, behavior, speech, dress, motor activity, and thought processes, able to follow commands   HENT: NCAT   Neck: no visualized mass   Resp: no respiratory distress   Neuro: no gross deficits   Skin: no discoloration or lesions of concern on visible areas   Psychiatric: normal affect, consistent with stated mood, no evidence of hallucinations     Additional exam findings: We discussed the expected course, resolution and complications of the diagnosis(es) in detail. Medication risks, benefits, costs, interactions, and alternatives were discussed as indicated. I advised him to contact the office if his condition worsens, changes or fails to improve as anticipated. He expressed understanding with the diagnosis(es) and plan. Patricio Mercado, was evaluated through a synchronous (real-time) audio-video encounter. The patient (or guardian if applicable) is aware that this is a billable service, which includes applicable co-pays. This Virtual Visit was conducted with patient's (and/or legal guardian's) consent. The visit was conducted pursuant to the emergency declaration under the [address] [place] [address] [facility] authority and the [employer] and [employer] General Act. Patient identification was verified, and a caregiver was present when appropriate. The patient was located at: Other: car  The provider was located at:  Facility (Appt Department): [id number] [facility] [address]        Roque Arshad PA-C

## 2022-07-13 NOTE — PROGRESS NOTES
Ed West  Identified pt with two pt identifiers(name and ). Chief Complaint   Patient presents with    Numbness       Reviewed record In preparation for visit and have obtained necessary documentation. 1. Have you been to the ER, urgent care clinic or hospitalized since your last visit? No     2. Have you seen or consulted any other health care providers outside of the [address] [place] [address] since your last visit? Include any pap smears or colon screening. No    Vitals reviewed with provider. Health Maintenance reviewed:     Health Maintenance Due   Topic    Hepatitis C Screening     Pneumococcal 0-64 years (1 - PCV)    COVID-19 Vaccine (3 - Booster for [employer] series)          Wt Readings from Last 3 Encounters:   22 294 lb 1.5 oz (133.4 kg)        Temp Readings from Last 3 Encounters:   22 98.1 °F (36.7 °C)        BP Readings from Last 3 Encounters:   22 (!) 148/103        Pulse Readings from Last 3 Encounters:   22 78        Vitals:    22 1500   PainSc:   0 - No pain          Learning Assessment:   :       Learning Assessment 2022   PRIMARY LEARNER Patient   HIGHEST LEVEL OF EDUCATION - PRIMARY LEARNER  GRADUATED HIGH SCHOOL OR GED   BARRIERS PRIMARY LEARNER NONE   PRIMARY LANGUAGE ENGLISH   LEARNER PREFERENCE PRIMARY DEMONSTRATION   ANSWERED BY Patient   RELATIONSHIP SELF        Depression Screening:   :       3 most recent PHQ Screens 2022   Little interest or pleasure in doing things Not at all   Feeling down, depressed, irritable, or hopeless Not at all   Total Score PHQ 2 0        Fall Risk Assessment:   :     No flowsheet data found. Abuse Screening:   :     No flowsheet data found.      ADL Screening:   :       ADL Assessment 2022   Feeding yourself No Help Needed   Getting from bed to chair No Help Needed   Getting dressed No Help Needed   Bathing or showering No Help Needed   Walk across the room (includes cane/walker) No Help Needed Using the telphone No Help Needed   Taking your medications No Help Needed   Preparing meals No Help Needed   Managing money (expenses/bills) No Help Needed   Moderately strenuous housework (laundry) No Help Needed   Shopping for personal items (toiletries/medicines) No Help Needed   Shopping for groceries No Help Needed   Driving No Help Needed   Climbing a flight of stairs No Help Needed   Getting to places beyond walking distances No Help Needed

## 2022-07-14 LAB
ALBUMIN SERPL-MCNC: 4.5 G/DL (ref 4–5)
ALBUMIN/GLOB SERPL: 1.8 {RATIO} (ref 1.2–2.2)
ALP SERPL-CCNC: 83 IU/L (ref 44–121)
ALT SERPL-CCNC: 24 IU/L (ref 0–44)
AST SERPL-CCNC: 21 IU/L (ref 0–40)
BASOPHILS # BLD AUTO: 0.1 X10E3/UL (ref 0–0.2)
BASOPHILS NFR BLD AUTO: 1 %
BILIRUB SERPL-MCNC: 0.3 MG/DL (ref 0–1.2)
BUN SERPL-MCNC: 11 MG/DL (ref 6–20)
BUN/CREAT SERPL: 12 (ref 9–20)
CALCIUM SERPL-MCNC: 9.2 MG/DL (ref 8.7–10.2)
CHLORIDE SERPL-SCNC: 108 MMOL/L (ref 96–106)
CO2 SERPL-SCNC: 20 MMOL/L (ref 20–29)
CREAT SERPL-MCNC: 0.95 MG/DL (ref 0.76–1.27)
EGFR: 108 ML/MIN/1.73
EOSINOPHIL # BLD AUTO: 0.3 X10E3/UL (ref 0–0.4)
EOSINOPHIL NFR BLD AUTO: 3 %
ERYTHROCYTE [DISTWIDTH] IN BLOOD BY AUTOMATED COUNT: 12.1 % (ref 11.6–15.4)
EST. AVERAGE GLUCOSE BLD GHB EST-MCNC: 103 MG/DL
GLOBULIN SER CALC-MCNC: 2.5 G/DL (ref 1.5–4.5)
GLUCOSE SERPL-MCNC: 103 MG/DL (ref 65–99)
HBA1C MFR BLD: 5.2 % (ref 4.8–5.6)
HCT VFR BLD AUTO: 41 % (ref 37.5–51)
HGB BLD-MCNC: 13.4 G/DL (ref 13–17.7)
IMM GRANULOCYTES # BLD AUTO: 0 X10E3/UL (ref 0–0.1)
IMM GRANULOCYTES NFR BLD AUTO: 0 %
LYMPHOCYTES # BLD AUTO: 3.5 X10E3/UL (ref 0.7–3.1)
LYMPHOCYTES NFR BLD AUTO: 42 %
MCH RBC QN AUTO: 27.9 PG (ref 26.6–33)
MCHC RBC AUTO-ENTMCNC: 32.7 G/DL (ref 31.5–35.7)
MCV RBC AUTO: 85 FL (ref 79–97)
MONOCYTES # BLD AUTO: 0.8 X10E3/UL (ref 0.1–0.9)
MONOCYTES NFR BLD AUTO: 10 %
NEUTROPHILS # BLD AUTO: 3.6 X10E3/UL (ref 1.4–7)
NEUTROPHILS NFR BLD AUTO: 44 %
PLATELET # BLD AUTO: 337 X10E3/UL (ref 150–450)
POTASSIUM SERPL-SCNC: 4.3 MMOL/L (ref 3.5–5.2)
PROT SERPL-MCNC: 7 G/DL (ref 6–8.5)
RBC # BLD AUTO: 4.81 X10E6/UL (ref 4.14–5.8)
SODIUM SERPL-SCNC: 142 MMOL/L (ref 134–144)
TESTOST FREE SERPL-MCNC: 11.1 PG/ML (ref 8.7–25.1)
TESTOST SERPL-MCNC: 430 NG/DL (ref 264–916)
WBC # BLD AUTO: 8.2 X10E3/UL (ref 3.4–10.8)

## 2022-07-18 ENCOUNTER — HOSPITAL ENCOUNTER (EMERGENCY)
Age: 32
Discharge: HOME | End: 2022-07-18
Attending: EMERGENCY MEDICINE
Payer: COMMERCIAL

## 2022-07-18 ENCOUNTER — APPOINTMENT (OUTPATIENT)
Dept: GENERAL RADIOLOGY | Age: 32
End: 2022-07-18
Attending: EMERGENCY MEDICINE
Payer: COMMERCIAL

## 2022-07-18 VITALS
OXYGEN SATURATION: 100 % | SYSTOLIC BLOOD PRESSURE: 162 MMHG | TEMPERATURE: 97 F | HEART RATE: 91 BPM | RESPIRATION RATE: 20 BRPM | DIASTOLIC BLOOD PRESSURE: 91 MMHG

## 2022-07-18 DIAGNOSIS — R06.02 SOB (SHORTNESS OF BREATH): ICD-10-CM

## 2022-07-18 DIAGNOSIS — R07.89 ATYPICAL CHEST PAIN: Primary | ICD-10-CM

## 2022-07-18 LAB
ALBUMIN SERPL-MCNC: 4.1 G/DL (ref 3.5–5)
ALBUMIN/GLOB SERPL: 1 {RATIO} (ref 1.1–2.2)
ALP SERPL-CCNC: 89 U/L (ref 45–117)
ALT SERPL-CCNC: 28 U/L (ref 12–78)
ANION GAP SERPL CALC-SCNC: 6 MMOL/L (ref 5–15)
AST SERPL-CCNC: 12 U/L (ref 15–37)
BASOPHILS # BLD: 0.1 K/UL (ref 0–0.1)
BASOPHILS NFR BLD: 1 % (ref 0–1)
BILIRUB SERPL-MCNC: 0.4 MG/DL (ref 0.2–1)
BUN SERPL-MCNC: 12 MG/DL (ref 6–20)
BUN/CREAT SERPL: 11 (ref 12–20)
CALCIUM SERPL-MCNC: 9.4 MG/DL (ref 8.5–10.1)
CHLORIDE SERPL-SCNC: 108 MMOL/L (ref 97–108)
CO2 SERPL-SCNC: 25 MMOL/L (ref 21–32)
COMMENT, HOLDF: NORMAL
CREAT SERPL-MCNC: 1.1 MG/DL (ref 0.7–1.3)
D DIMER PPP FEU-MCNC: <0.19 MG/L FEU (ref 0–0.65)
DIFFERENTIAL METHOD BLD: ABNORMAL
EOSINOPHIL # BLD: 0.3 K/UL (ref 0–0.4)
EOSINOPHIL NFR BLD: 3 % (ref 0–7)
ERYTHROCYTE [DISTWIDTH] IN BLOOD BY AUTOMATED COUNT: 12.2 % (ref 11.5–14.5)
GLOBULIN SER CALC-MCNC: 4 G/DL (ref 2–4)
GLUCOSE SERPL-MCNC: 127 MG/DL (ref 65–100)
HCT VFR BLD AUTO: 42.8 % (ref 36.6–50.3)
HGB BLD-MCNC: 14 G/DL (ref 12.1–17)
IMM GRANULOCYTES # BLD AUTO: 0 K/UL (ref 0–0.04)
IMM GRANULOCYTES NFR BLD AUTO: 0 % (ref 0–0.5)
LYMPHOCYTES # BLD: 4 K/UL (ref 0.8–3.5)
LYMPHOCYTES NFR BLD: 48 % (ref 12–49)
MCH RBC QN AUTO: 28.1 PG (ref 26–34)
MCHC RBC AUTO-ENTMCNC: 32.7 G/DL (ref 30–36.5)
MCV RBC AUTO: 85.8 FL (ref 80–99)
MONOCYTES # BLD: 0.7 K/UL (ref 0–1)
MONOCYTES NFR BLD: 8 % (ref 5–13)
NEUTS SEG # BLD: 3.3 K/UL (ref 1.8–8)
NEUTS SEG NFR BLD: 40 % (ref 32–75)
NRBC # BLD: 0 K/UL (ref 0–0.01)
NRBC BLD-RTO: 0 PER 100 WBC
PLATELET # BLD AUTO: 317 K/UL (ref 150–400)
PMV BLD AUTO: 9.5 FL (ref 8.9–12.9)
POTASSIUM SERPL-SCNC: 3.7 MMOL/L (ref 3.5–5.1)
PROT SERPL-MCNC: 8.1 G/DL (ref 6.4–8.2)
RBC # BLD AUTO: 4.99 M/UL (ref 4.1–5.7)
SAMPLES BEING HELD,HOLD: NORMAL
SODIUM SERPL-SCNC: 139 MMOL/L (ref 136–145)
TROPONIN-HIGH SENSITIVITY: 4 NG/L (ref 0–76)
TROPONIN-HIGH SENSITIVITY: 4 NG/L (ref 0–76)
WBC # BLD AUTO: 8.3 K/UL (ref 4.1–11.1)

## 2022-07-18 PROCEDURE — 96374 THER/PROPH/DIAG INJ IV PUSH: CPT

## 2022-07-18 PROCEDURE — 74011250637 HC RX REV CODE- 250/637: Performed by: PHYSICIAN ASSISTANT

## 2022-07-18 PROCEDURE — 74011000250 HC RX REV CODE- 250: Performed by: PHYSICIAN ASSISTANT

## 2022-07-18 PROCEDURE — 99285 EMERGENCY DEPT VISIT HI MDM: CPT

## 2022-07-18 PROCEDURE — 71046 X-RAY EXAM CHEST 2 VIEWS: CPT

## 2022-07-18 PROCEDURE — 85025 COMPLETE CBC W/AUTO DIFF WBC: CPT

## 2022-07-18 PROCEDURE — 93005 ELECTROCARDIOGRAM TRACING: CPT

## 2022-07-18 PROCEDURE — 84484 ASSAY OF TROPONIN QUANT: CPT

## 2022-07-18 PROCEDURE — 85379 FIBRIN DEGRADATION QUANT: CPT

## 2022-07-18 PROCEDURE — 74011250636 HC RX REV CODE- 250/636: Performed by: PHYSICIAN ASSISTANT

## 2022-07-18 PROCEDURE — 36415 COLL VENOUS BLD VENIPUNCTURE: CPT

## 2022-07-18 PROCEDURE — 80053 COMPREHEN METABOLIC PANEL: CPT

## 2022-07-18 RX ORDER — KETOROLAC TROMETHAMINE 30 MG/ML
15 INJECTION, SOLUTION INTRAMUSCULAR; INTRAVENOUS
Status: COMPLETED | OUTPATIENT
Start: 2022-07-18 | End: 2022-07-18

## 2022-07-18 RX ADMIN — ALUMINUM HYDROXIDE, MAGNESIUM HYDROXIDE, AND SIMETHICONE 40 ML: 200; 200; 20 SUSPENSION ORAL at 17:58

## 2022-07-18 RX ADMIN — KETOROLAC TROMETHAMINE 15 MG: 30 INJECTION, SOLUTION INTRAMUSCULAR; INTRAVENOUS at 17:58

## 2022-07-18 NOTE — ED PROVIDER NOTES
27year old M presenting for CP. Pt notes that today he was doing work at home when all of the sudden \"my breathing was acting up,\" then had chest pressure - about 2PM.  Middle, non-radiating. Pt notes that now the anterior chest is sore, may radiate to the back. Notes sinus issues. States that he had silicone injected into his cheeks and is worried \"it may have migrated into my bloodstream.\"  Notes months of numbness in the fingers/hand/feet. Sometimes will feel weak. PCP has been working this up. Pt notes that CP at current time has \"lightened up a little bit. \"  No longer feels SOB. Pain was pleuritic in nature. No hemoptysis. No unilateral leg pain. swelling. No personal or family hx VTE. No abdominal pain or N/V. No dioaphoresis. PMHx: brain aneurysm s/p coiling 2017  PSx: as above  Social: + smoker. No alcohol. No drugs. Works from home.     No known family hx CAD  NKDA           Past Medical History:   Diagnosis Date    Brain aneurysm 2017       Past Surgical History:   Procedure Laterality Date    HX OTHER SURGICAL  2017    surgery for brain aneurysm         Family History:   Problem Relation Age of Onset    Diabetes Mother     Hypertension Mother    Zannie Cowden Elevated Lipids Mother     No Known Problems Father        Social History     Socioeconomic History    Marital status: SINGLE     Spouse name: Not on file    Number of children: Not on file    Years of education: Not on file    Highest education level: Not on file   Occupational History    Not on file   Tobacco Use    Smoking status: Current Every Day Smoker     Packs/day: 1.00     Types: Cigarettes    Smokeless tobacco: Never Used   Vaping Use    Vaping Use: Former   Substance and Sexual Activity    Alcohol use: Yes     Comment: occasional     Drug use: Not Currently     Types: Cocaine    Sexual activity: Not on file   Other Topics Concern    Not on file   Social History Narrative    Not on file     Social Determinants of Health     Financial Resource Strain:     Difficulty of Paying Living Expenses: Not on file   Food Insecurity:     Worried About Running Out of Food in the Last Year: Not on file    Bruce of Food in the Last Year: Not on file   Transportation Needs:     Lack of Transportation (Medical): Not on file    Lack of Transportation (Non-Medical): Not on file   Physical Activity:     Days of Exercise per Week: Not on file    Minutes of Exercise per Session: Not on file   Stress:     Feeling of Stress : Not on file   Social Connections:     Frequency of Communication with Friends and Family: Not on file    Frequency of Social Gatherings with Friends and Family: Not on file    Attends [employer] Services: Not on file    Active Member of [address] [place] [address] [employer] or Organizations: Not on file    Attends Club or Organization Meetings: Not on file    Marital Status: Not on file   Intimate Partner Violence:     Fear of Current or Ex-Partner: Not on file    Emotionally Abused: Not on file    Physically Abused: Not on file    Sexually Abused: Not on file   Housing Stability:     Unable to Pay for Housing in the Last Year: Not on file    Number of [place] in the Last Year: Not on file    Unstable Housing in the Last Year: Not on file         ALLERGIES: Patient has no known allergies. Review of Systems   Constitutional: Negative for fever. HENT: Negative for facial swelling. Respiratory: Positive for shortness of breath. Cardiovascular: Positive for chest pain. Gastrointestinal: Negative for vomiting. Skin: Negative for wound. Neurological: Negative for syncope. All other systems reviewed and are negative. Vitals:    07/18/22 1542   BP: (!) 162/91   Pulse: 91   Resp: 20   Temp: 97 °F (36.1 °C)   SpO2: 100%            Physical Exam  Vitals and nursing note reviewed. Constitutional:       General: He is not in acute distress. Appearance: He is well-developed.       Comments: Pleasant, well-appearing, no distress   HENT:      Head: Normocephalic and atraumatic. Right Ear: External ear normal.      Left Ear: External ear normal.   Eyes:      General: No scleral icterus. Conjunctiva/sclera: Conjunctivae normal.   Neck:      Trachea: No tracheal deviation. Cardiovascular:      Rate and Rhythm: Normal rate and regular rhythm. Heart sounds: Normal heart sounds. No murmur heard. No friction rub. No gallop. Pulmonary:      Effort: Pulmonary effort is normal. No respiratory distress. Breath sounds: Normal breath sounds. No stridor. No wheezing. Abdominal:      General: There is no distension. Palpations: Abdomen is soft. Tenderness: There is no abdominal tenderness. Musculoskeletal:         General: Normal range of motion. Cervical back: Neck supple. Skin:     General: Skin is warm and dry. Neurological:      Mental Status: He is alert and oriented to person, place, and time. Psychiatric:         Behavior: Behavior normal.          MDM  Number of Diagnoses or Management Options  Atypical chest pain  SOB (shortness of breath)  Diagnosis management comments: 26-year-old male presenting to the ED for episode of chest pain that occurred this afternoon at about 2 PM with some shortness of breath. No exertional symptoms. Pain was somewhat pleuritic but patient with normal vital signs and no PE risk factors. Nonischemic EKG, normal chest x-ray, normal serial cardiac enzymes and basic labs. Negative dimer. Low risk HEAR score, PERC negative. Discussed with patient that given history of hiatal hernia, symptoms could have been GI in nature, also discussed possible musculoskeletal chest pain, discussed supportive care at home, PCP follow-up, return precautions given.        Amount and/or Complexity of Data Reviewed  Clinical lab tests: ordered and reviewed  Tests in the radiology section of CPT®: ordered and reviewed  Discuss the patient with other providers: yes (Dr. Jitendra Quiroga, ED attending)           Procedures

## 2022-07-18 NOTE — ED TRIAGE NOTES
Pt c/o constant chest pressure that started this am, denies fever, +sob, +headache started today bilateral arm numbness , denies n/v

## 2022-07-18 NOTE — DISCHARGE INSTRUCTIONS
Return for new or worsening symptoms. Your work-up tonight including your EKG, chest x-ray, cardiac enzymes and D-dimer that would look for a blood clot was all reassuring. As discussed, the pain could be GI in nature or possibly musculoskeletal.  Make a follow-up appointment with your primary care provider.

## 2022-07-19 LAB
ATRIAL RATE: 82 BPM
CALCULATED P AXIS, ECG09: 53 DEGREES
CALCULATED R AXIS, ECG10: 82 DEGREES
CALCULATED T AXIS, ECG11: 29 DEGREES
DIAGNOSIS, 93000: NORMAL
P-R INTERVAL, ECG05: 164 MS
Q-T INTERVAL, ECG07: 378 MS
QRS DURATION, ECG06: 92 MS
QTC CALCULATION (BEZET), ECG08: 441 MS
VENTRICULAR RATE, ECG03: 82 BPM

## 2022-08-23 ENCOUNTER — PATIENT MESSAGE (OUTPATIENT)
Dept: INTERNAL MEDICINE CLINIC | Age: 32
End: 2022-08-23

## 2022-08-23 ENCOUNTER — VIRTUAL VISIT (OUTPATIENT)
Dept: INTERNAL MEDICINE CLINIC | Age: 32
End: 2022-08-23
Payer: COMMERCIAL

## 2022-08-23 DIAGNOSIS — F34.1 DYSTHYMIA: Primary | ICD-10-CM

## 2022-08-23 DIAGNOSIS — I67.1 CEREBRAL ANEURYSM: ICD-10-CM

## 2022-08-23 DIAGNOSIS — I67.1 CEREBRAL ANEURYSM: Primary | ICD-10-CM

## 2022-08-23 PROCEDURE — 99214 OFFICE O/P EST MOD 30 MIN: CPT | Performed by: PHYSICIAN ASSISTANT

## 2022-08-23 RX ORDER — BUPROPION HYDROCHLORIDE 75 MG/1
75 TABLET ORAL 2 TIMES DAILY
Qty: 60 TABLET | Refills: 2 | Status: SHIPPED | OUTPATIENT
Start: 2022-08-23 | End: 2022-09-15

## 2022-08-23 NOTE — PROGRESS NOTES
VV-Pt is aware of billable appt depending on insurance plan. Preferred number [phone]  Pt verbally agrees to labs, orders, and others to be mailed to confirmed home address. Identified pt with two pt identifiers(name and ). Reviewed record in preparation for visit and have obtained necessary documentation. All patient medications has been reviewed. Chief Complaint   Patient presents with    Depression       Health Maintenance Due   Topic    Hepatitis C Screening     Pneumococcal 0-64 years (1 - PCV)    COVID-19 Vaccine (3 - Booster for [employer] series)     Health Maintenance Review: Patient reminded of \"due or due soon\" health maintenance. I have asked the patient to contact his/her primary care provider (PCP) for follow-up on his/her health maintenance. Wt Readings from Last 3 Encounters:   22 294 lb 1.5 oz (133.4 kg)     Temp Readings from Last 3 Encounters:   22 97 °F (36.1 °C)   22 98.1 °F (36.7 °C)     BP Readings from Last 3 Encounters:   22 (!) 162/91   22 (!) 148/103     Pulse Readings from Last 3 Encounters:   22 91   22 78         Coordination of Care Questionnaire:   1) Have you been to an emergency room, urgent care, or hospitalized since your last visit?   no       2. Have seen or consulted any other health care provider since your last visit?  NO

## 2022-08-23 NOTE — PROGRESS NOTES
Sarahi Kaur is a 27 y.o. male who was seen by synchronous (real-time) audio-video technology on 8/23/2022 for Depression        Assessment & Plan:   Diagnoses and all orders for this visit:    1. Dysthymia  -     buPROPion (WELLBUTRIN) 75 mg tablet; Take 1 Tablet by mouth two (2) times a day. Discussed diagnosis of MDD vs dysthmia. Will begin BID wellbutrin for symptom and advised to begin therapy with work place therapist and can consider [facility] if he wishes to go to a place outside of work provided mental health counseling. Will trial wellbutrin and can consider d/c of singulair in future to see effect of stopping medicine for side effect. Will hold on SSRI due to hx of  problems related to low T from transition male-female in past. Follow up as planned for med f/u  2. Cerebral aneurysm  -     REFERRAL TO NEUROLOGY  New referral for hx of cerebral aneurysm placed so he can see Dr. Malcolm Gray    The complexity of medical decision making for this visit is moderate     I spent at least 30 minutes on this visit with this established patient. 712  Subjective:   Patient presents via [place] [facility] virtual visit for gradually worsening dysthmia symptoms with associated anhedonia, loss of appetite and energy loss. He has no significant hx of depression or mood disorder but notes this has been going on mildly for several months with lack of motivation to call and set up appointments for chronic and new problems and it seems to have peaked in the last 2 weeks with depressed mood and poor thoughts. He is not able to shake it even with talking with his family. He is curious about effects of singulair as that has potential s/e of depression. He has hx of male to female transition in the past that was haulted. He has no SI/HI. Has appt with sleep medicine this week for DAMIEN. He has not made appts for GI, Cardiology, ENT, Endocrine or neurosurgery.     Working from home for [employer] and is still working with manager to get paperwork sorted out to allow for appointments. Prior to Admission medications    Medication Sig Start Date End Date Taking? Authorizing Provider   buPROPion [facility]) 75 mg tablet Take 1 Tablet by mouth two (2) times a day. 8/23/22  Yes Rakel Tsai PA-C   topiramate (TOPAMAX) 50 mg tablet Take 1 Tablet by mouth two (2) times a day. 4/29/22  Yes Rakel Tsai PA-C   Lactobacillus acidophilus (PROBIOTIC PO) Take  by mouth. Yes Provider, Historical   fluticasone propionate (FLONASE) 50 mcg/actuation nasal spray 2 Sprays by Both Nostrils route daily. Yes Provider, Historical   fexofenadine (ALLEGRA) 60 mg tablet Take 1 Tablet by mouth daily. 4/14/22  Yes Rakel Tsai PA-C   montelukast (SINGULAIR) 10 mg tablet Take 1 Tablet by mouth daily. 4/14/22  Yes Rakel Tsai PA-C   Omeprazole delayed release (PRILOSEC D/R) 20 mg tablet Take 1 Tablet by mouth daily. 4/14/22  Yes Rakel Tsai PA-C   metFORMIN (GLUCOPHAGE) 500 mg tablet Take 1 Tablet by mouth daily (with dinner). 4/14/22  Yes Rakel Tsai PA-C   multivitamin (ONE A DAY) tablet Take 1 Tablet by mouth daily.   Patient not taking: No sig reported    Provider, Historical     Patient Active Problem List   Diagnosis Code    Non-seasonal allergic rhinitis J30.89    Cerebral aneurysm I67.1    Morbid obesity ([facility]) E66.01    Snoring R06.83    Family history of diabetes mellitus Z83.3    Orgasmic dysfunction F52.31    Mild intermittent asthma without complication W54.43    Hiatal hernia K44.9    Gastroesophageal reflux disease without esophagitis K21.9    Abnormal blood sugar R73.09    Low testosterone R79.89    Seasonal allergic rhinitis due to pollen J30.1     Patient Active Problem List    Diagnosis Date Noted    Abnormal blood sugar 04/29/2022    Low testosterone 04/29/2022    Seasonal allergic rhinitis due to pollen 04/29/2022    Non-seasonal allergic rhinitis 04/14/2022    Cerebral aneurysm 04/14/2022    Morbid obesity ([facility] [address] 04/14/2022    Snoring 04/14/2022    Family history of diabetes mellitus 04/14/2022    Orgasmic dysfunction 04/14/2022    Mild intermittent asthma without complication 44/85/8924    Hiatal hernia 04/14/2022    Gastroesophageal reflux disease without esophagitis 04/14/2022     Current Outpatient Medications   Medication Sig Dispense Refill    buPROPion (WELLBUTRIN) 75 mg tablet Take 1 Tablet by mouth two (2) times a day. 60 Tablet 2    topiramate (TOPAMAX) 50 mg tablet Take 1 Tablet by mouth two (2) times a day. 60 Tablet 5    Lactobacillus acidophilus (PROBIOTIC PO) Take  by mouth. fluticasone propionate (FLONASE) 50 mcg/actuation nasal spray 2 Sprays by Both Nostrils route daily. fexofenadine (ALLEGRA) 60 mg tablet Take 1 Tablet by mouth daily. 90 Tablet 3    montelukast (SINGULAIR) 10 mg tablet Take 1 Tablet by mouth daily. 30 Tablet 5    Omeprazole delayed release (PRILOSEC D/R) 20 mg tablet Take 1 Tablet by mouth daily. 90 Tablet 3    metFORMIN (GLUCOPHAGE) 500 mg tablet Take 1 Tablet by mouth daily (with dinner). 30 Tablet 5    multivitamin (ONE A DAY) tablet Take 1 Tablet by mouth daily. (Patient not taking: No sig reported)       No Known Allergies  Past Medical History:   Diagnosis Date    Brain aneurysm 2017     Past Surgical History:   Procedure Laterality Date    HX OTHER SURGICAL  2017    surgery for brain aneurysm     Family History   Problem Relation Age of Onset    Diabetes Mother     Hypertension Mother     Elevated Lipids Mother     No Known Problems Father      Social History     Tobacco Use    Smoking status: Every Day     Packs/day: 1.00     Types: Cigarettes    Smokeless tobacco: Never   Substance Use Topics    Alcohol use: Yes     Comment: occasional        Review of Systems   Constitutional:  Positive for malaise/fatigue. Negative for chills, fever and weight loss. HENT: Negative. Eyes: Negative. Respiratory:  Negative for cough, shortness of breath and wheezing. Cardiovascular:  Negative for palpitations and leg swelling. Gastrointestinal:  Negative for blood in stool, constipation, diarrhea and melena. Genitourinary:  Negative for dysuria and frequency. Skin:  Negative for rash. Neurological:  Negative for speech change and weakness. Endo/Heme/Allergies:  Does not bruise/bleed easily. Psychiatric/Behavioral:  Positive for depression. Negative for hallucinations, memory loss, substance abuse and suicidal ideas. The patient is not nervous/anxious and does not have insomnia. All other systems reviewed and are negative. Objective:     Patient-Reported Vitals 4/29/2022   Patient-Reported Weight 304      General: alert, cooperative, no distress   Mental  status: normal mood, behavior, speech, dress, motor activity, and thought processes, able to follow commands   HENT: NCAT   Neck: no visualized mass   Resp: no respiratory distress   Neuro: no gross deficits   Skin: no discoloration or lesions of concern on visible areas   Psychiatric: normal affect, consistent with stated mood, no evidence of hallucinations     Additional exam findings:   Well appearing    We discussed the expected course, resolution and complications of the diagnosis(es) in detail. Medication risks, benefits, costs, interactions, and alternatives were discussed as indicated. I advised him to contact the office if his condition worsens, changes or fails to improve as anticipated. He expressed understanding with the diagnosis(es) and plan. Coleen Flaherty, was evaluated through a synchronous (real-time) audio-video encounter. The patient (or guardian if applicable) is aware that this is a billable service, which includes applicable co-pays. This Virtual Visit was conducted with patient's (and/or legal guardian's) consent.  The visit was conducted pursuant to the emergency declaration under the [facility] [place], [address] waiver authority and the Coronavirus Preparedness and Response Supplemental Appropriations Act. Patient identification was verified, and a caregiver was present when appropriate. The patient was located at: Home: [address] [address]  The provider was located at:  Facility (Appt Department): [address] [facility] [address]        Kiley Logan PA-C

## 2022-08-24 NOTE — TELEPHONE ENCOUNTER
From: Valentín Schmid PA-C  To: Reford Askew  Sent: 8/23/2022 11:16 AM EDT  Subject: Neurosurgery    Referred To:             [place] [id number]         Phone:  Fax:    [phone]

## 2022-09-02 ENCOUNTER — TELEPHONE (OUTPATIENT)
Dept: INTERNAL MEDICINE CLINIC | Age: 32
End: 2022-09-02

## 2022-09-02 NOTE — TELEPHONE ENCOUNTER
Patient called stating he has history Diverticulitis in the past. Patient think another flare up and cannot use the bathroom. Abdominal pain. Started yesterday. Pain level 9. In the past patient said was given antibiotic. Patient has not taken anything for it.

## 2022-09-13 ENCOUNTER — TELEPHONE (OUTPATIENT)
Dept: INTERNAL MEDICINE CLINIC | Age: 32
End: 2022-09-13

## 2022-09-13 DIAGNOSIS — J32.9 SINUSITIS, UNSPECIFIED CHRONICITY, UNSPECIFIED LOCATION: Primary | ICD-10-CM

## 2022-09-13 RX ORDER — IBUPROFEN 800 MG/1
800 TABLET ORAL
Qty: 30 TABLET | Refills: 1 | Status: SHIPPED | OUTPATIENT
Start: 2022-09-13

## 2022-09-13 NOTE — TELEPHONE ENCOUNTER
Patient called stating \"Sinus inflamed,\" Can you call him in Ibuprofen 800mg? Sinus pressure and ear. Making him dizzy.  I did tell patient he may need to be seen even if virtual.

## 2022-09-15 ENCOUNTER — OFFICE VISIT (OUTPATIENT)
Dept: INTERNAL MEDICINE CLINIC | Age: 32
End: 2022-09-15

## 2022-09-15 VITALS
SYSTOLIC BLOOD PRESSURE: 124 MMHG | HEIGHT: 74 IN | RESPIRATION RATE: 16 BRPM | DIASTOLIC BLOOD PRESSURE: 85 MMHG | HEART RATE: 81 BPM | WEIGHT: 288.8 LBS | OXYGEN SATURATION: 98 % | BODY MASS INDEX: 37.06 KG/M2

## 2022-09-15 DIAGNOSIS — Z00.01 ENCOUNTER FOR PREVENTATIVE ADULT HEALTH CARE EXAM WITH ABNORMAL FINDINGS: Primary | ICD-10-CM

## 2022-09-15 DIAGNOSIS — Z72.0 TOBACCO USE: ICD-10-CM

## 2022-09-15 DIAGNOSIS — H65.01 NON-RECURRENT ACUTE SEROUS OTITIS MEDIA OF RIGHT EAR: ICD-10-CM

## 2022-09-15 DIAGNOSIS — J01.01 ACUTE RECURRENT MAXILLARY SINUSITIS: ICD-10-CM

## 2022-09-15 DIAGNOSIS — F34.1 DYSTHYMIA: ICD-10-CM

## 2022-09-15 DIAGNOSIS — L02.91 ABSCESS: ICD-10-CM

## 2022-09-15 PROBLEM — E66.09 CLASS 2 OBESITY DUE TO EXCESS CALORIES WITHOUT SERIOUS COMORBIDITY WITH BODY MASS INDEX (BMI) OF 37.0 TO 37.9 IN ADULT: Status: ACTIVE | Noted: 2022-04-14

## 2022-09-15 PROBLEM — E66.812 CLASS 2 OBESITY DUE TO EXCESS CALORIES WITHOUT SERIOUS COMORBIDITY WITH BODY MASS INDEX (BMI) OF 37.0 TO 37.9 IN ADULT: Status: ACTIVE | Noted: 2022-04-14

## 2022-09-15 PROCEDURE — 99395 PREV VISIT EST AGE 18-39: CPT | Performed by: PHYSICIAN ASSISTANT

## 2022-09-15 RX ORDER — BUPROPION HYDROCHLORIDE 150 MG/1
150 TABLET ORAL
Qty: 30 TABLET | Refills: 5 | Status: SHIPPED | OUTPATIENT
Start: 2022-09-15

## 2022-09-15 RX ORDER — AMOXICILLIN AND CLAVULANATE POTASSIUM 875; 125 MG/1; MG/1
1 TABLET, FILM COATED ORAL EVERY 12 HOURS
Qty: 14 TABLET | Refills: 0 | Status: SHIPPED | OUTPATIENT
Start: 2022-09-15 | End: 2022-10-24 | Stop reason: ALTCHOICE

## 2022-09-15 NOTE — PROGRESS NOTES
Rupseh Gordon is a 27y.o. year old male seen in clinic today for   Chief Complaint   Patient presents with    Physical     Room 4A //        he is here today to follow up for multiple conditions and to have CPE. He has multiple referral appts pending for endocrine to discuss low testosterone sp use of testosterone blockers during short term male to female sex change therapy in years past.   He is due to see Cardiology for some recurrent chest pains. 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  He is planning to see GI for repeat colonoscopy and establishing care for hx of gerd and diverticulitis. He is due to see neurosurgeon to discuss repeat head imaging for hx of cerebral aneurysm. Today he is down about 10 pounds from initial weight using topamax. He has been using 75 mg wellbutrin for mood as he found himself in a dysthmic state which is working well to improve his motivation and mood. He plans to get back on track of dieting. He continues to smoke. He has some R ear pain. He has chronic sinus issues and is planning to see ENT. He is using Singulair daily, Allegra and flonase. he specifically denies any SOB, HA , fevers, chills, N/V/D, urinary symptoms or other bowel changes. Current Outpatient Medications on File Prior to Visit   Medication Sig Dispense Refill    ibuprofen (MOTRIN) 800 mg tablet Take 1 Tablet by mouth every eight (8) hours as needed for Pain. 30 Tablet 1    topiramate (TOPAMAX) 50 mg tablet Take 1 Tablet by mouth two (2) times a day. 60 Tablet 5    multivitamin (ONE A DAY) tablet Take 1 Tablet by mouth daily. Lactobacillus acidophilus (PROBIOTIC PO) Take  by mouth. fluticasone propionate (FLONASE) 50 mcg/actuation nasal spray 2 Sprays by Both Nostrils route daily. fexofenadine (ALLEGRA) 60 mg tablet Take 1 Tablet by mouth daily. 90 Tablet 3    montelukast (SINGULAIR) 10 mg tablet Take 1 Tablet by mouth daily.  27 Tablet 5    Omeprazole delayed release (PRILOSEC D/R) 20 mg tablet Take 1 Tablet by mouth daily. 90 Tablet 3    [DISCONTINUED] buPROPion (WELLBUTRIN) 75 mg tablet Take 1 Tablet by mouth two (2) times a day. 60 Tablet 2    [DISCONTINUED] metFORMIN (GLUCOPHAGE) 500 mg tablet Take 1 Tablet by mouth daily (with dinner). (Patient not taking: Reported on 9/15/2022) 30 Tablet 5     No current facility-administered medications on file prior to visit.          No Known Allergies  Past Medical History:   Diagnosis Date    Brain aneurysm 2017      Past Surgical History:   Procedure Laterality Date    HX OTHER SURGICAL  2017    surgery for brain aneurysm        Family History   Problem Relation Age of Onset    Diabetes Mother     Hypertension Mother     Elevated Lipids Mother     No Known Problems Father         Social History     Socioeconomic History    Marital status: SINGLE     Spouse name: Not on file    Number of children: Not on file    Years of education: Not on file    Highest education level: Not on file   Occupational History    Not on file   Tobacco Use    Smoking status: Every Day     Packs/day: 1.00     Types: Cigarettes    Smokeless tobacco: Never   Vaping Use    Vaping Use: Former   Substance and Sexual Activity    Alcohol use: Yes     Comment: occasional     Drug use: Not Currently     Types: Cocaine    Sexual activity: Not on file   Other Topics Concern    Not on file   Social History Narrative    Not on file     Social Determinants of Health     Financial Resource Strain: Not on file   Food Insecurity: Not on file   Transportation Needs: Not on file   Physical Activity: Not on file   Stress: Not on file   Social Connections: Not on file   Intimate Partner Violence: Not on file   Housing Stability: Not on file           Visit Vitals  /85 (BP 1 Location: Left upper arm, BP Patient Position: Sitting, BP Cuff Size: Large adult)   Pulse 81   Resp 16   Ht 6' 2\" (1.88 m)   Wt 288 lb 12.8 oz (131 kg)   SpO2 98% BMI 37.08 kg/m²       Review of Systems   Constitutional:  Negative for chills, fever, malaise/fatigue and weight loss. HENT:  Positive for ear pain. Respiratory:  Negative for cough, shortness of breath and wheezing. Cardiovascular:  Negative for chest pain, palpitations and leg swelling. Gastrointestinal:  Negative for abdominal pain, blood in stool, constipation, diarrhea, heartburn, melena, nausea and vomiting. Genitourinary:  Negative for dysuria and frequency. Musculoskeletal:  Negative for myalgias. Skin:  Negative for rash. Neurological:  Negative for weakness and headaches. Endo/Heme/Allergies:  Does not bruise/bleed easily. Psychiatric/Behavioral:  Negative for depression, substance abuse and suicidal ideas. All other systems reviewed and are negative. Physical Exam  Vitals and nursing note reviewed. Constitutional:       Appearance: Normal appearance. He is obese. HENT:      Head: Normocephalic and atraumatic. Right Ear: Ear canal and external ear normal. Tympanic membrane is injected and bulging. Left Ear: Tympanic membrane, ear canal and external ear normal.      Nose: Nose normal.      Right Turbinates: Swollen. Left Turbinates: Swollen. Mouth/Throat:      Mouth: Mucous membranes are moist.      Pharynx: Oropharynx is clear. No oropharyngeal exudate or posterior oropharyngeal erythema. Eyes:      Conjunctiva/sclera: Conjunctivae normal.      Pupils: Pupils are equal, round, and reactive to light. Neck:      Vascular: No carotid bruit. Cardiovascular:      Rate and Rhythm: Normal rate and regular rhythm. Pulses: Normal pulses. Heart sounds: Normal heart sounds. No murmur heard. Pulmonary:      Effort: Pulmonary effort is normal.      Breath sounds: Normal breath sounds. No stridor. No wheezing, rhonchi or rales. Abdominal:      General: Abdomen is flat. Bowel sounds are normal. There is no distension. Tenderness:  There is no abdominal tenderness. There is no guarding. Musculoskeletal:         General: Normal range of motion. Cervical back: Normal range of motion and neck supple. No rigidity. Right lower leg: No edema. Left lower leg: No edema. Skin:     General: Skin is dry. Capillary Refill: Capillary refill takes less than 2 seconds. Neurological:      General: No focal deficit present. Mental Status: He is oriented to person, place, and time. Mental status is at baseline. Sensory: No sensory deficit. Motor: No weakness. Coordination: Coordination normal.      Gait: Gait normal.   Psychiatric:         Mood and Affect: Mood normal.        No results found for this or any previous visit (from the past 24 hour(s)). ASSESSMENT AND PLAN  Diagnoses and all orders for this visit:    1. Encounter for preventative adult health care exam with abnormal findings  Exam normal outside of ear infection and likely chronic sinusitis. Doing well with weight loss goals especially since mood has improved  2. Non-recurrent acute serous otitis media of right ear  -     amoxicillin-clavulanate (AUGMENTIN) 875-125 mg per tablet; Take 1 Tablet by mouth every twelve (12) hours. Treat ear and sinus inflammation with augmentin. 3. Acute recurrent maxillary sinusitis  -     amoxicillin-clavulanate (AUGMENTIN) 875-125 mg per tablet; Take 1 Tablet by mouth every twelve (12) hours. 4. Tobacco use  Continue wellbutrin, likely contributing to chronic sinusitis and ear infection  5. Abscess  To R axilla, advised to contact derm. Not painful or swollen now  6. Dysthymia  -     buPROPion XL (WELLBUTRIN XL) 150 mg tablet; Take 1 Tablet by mouth every morning. Requests once daily, will increase to 150 mg daily.  Hopes to help with cigarette smoking    Continue with planned follow up with specialists as planned     I have discussed the diagnosis with the patient and the intended plan as seen in the above orders. Patient is in agreement. The patient has received an after-visit summary and questions were answered concerning future plans. I have discussed medication side effects and warnings with the patient as well.     Teresa Brown PA-C

## 2022-09-15 NOTE — PROGRESS NOTES
[facility]  Identified pt with two pt identifiers(name and ). Chief Complaint   Patient presents with    Physical     Room 4A //        Reviewed record In preparation for visit and have obtained necessary documentation. 1. Have you been to the ER, urgent care clinic or hospitalized since your last visit? No     2. Have you seen or consulted any other health care providers outside of the [address] [place] [address] since your last visit? Include any pap smears or colon screening. No    Patient does not have an advance directive. Vitals reviewed with provider.     Health Maintenance reviewed:     Health Maintenance Due   Topic    Hepatitis C Screening     Pneumococcal 0-64 years (1 - PCV)    COVID-19 Vaccine (3 - Booster for Pfizer series)    Flu Vaccine (1)          Wt Readings from Last 3 Encounters:   09/15/22 288 lb 12.8 oz (131 kg)   22 294 lb 1.5 oz (133.4 kg)        Temp Readings from Last 3 Encounters:   22 97 °F (36.1 °C)   22 98.1 °F (36.7 °C)        BP Readings from Last 3 Encounters:   22 (!) 162/91   22 (!) 148/103        Pulse Readings from Last 3 Encounters:   22 91   22 78        Vitals:    09/15/22 0814   Resp: 16   Weight: 288 lb 12.8 oz (131 kg)   Height: 6' 2\" (1.88 m)   PainSc:   0 - No pain          Learning Assessment:   :       Learning Assessment 2022   PRIMARY LEARNER Patient   HIGHEST LEVEL OF EDUCATION - PRIMARY LEARNER  GRADUATED HIGH SCHOOL OR GED   BARRIERS PRIMARY LEARNER NONE   PRIMARY LANGUAGE ENGLISH   LEARNER PREFERENCE PRIMARY DEMONSTRATION   ANSWERED BY Patient   RELATIONSHIP SELF        Depression Screening:   :       3 most recent PHQ Screens 2022   Little interest or pleasure in doing things Nearly every day   Feeling down, depressed, irritable, or hopeless More than half the days   Total Score PHQ 2 5   Trouble falling or staying asleep, or sleeping too much Nearly every day   Feeling tired or having little energy Nearly every day   Poor appetite, weight loss, or overeating Nearly every day   Feeling bad about yourself - or that you are a failure or have let yourself or your family down More than half the days   Trouble concentrating on things such as school, work, reading, or watching TV Nearly every day   Moving or speaking so slowly that other people could have noticed; or the opposite being so fidgety that others notice Several days   Thoughts of being better off dead, or hurting yourself in some way Not at all   PHQ 9 Score 20        Fall Risk Assessment:   :     No flowsheet data found. Abuse Screening:   :     No flowsheet data found.      ADL Screening:   :       ADL Assessment 4/14/2022   Feeding yourself No Help Needed   Getting from bed to chair No Help Needed   Getting dressed No Help Needed   Bathing or showering No Help Needed   Walk across the room (includes cane/walker) No Help Needed   Using the telphone No Help Needed   Taking your medications No Help Needed   Preparing meals No Help Needed   Managing money (expenses/bills) No Help Needed   Moderately strenuous housework (laundry) No Help Needed   Shopping for personal items (toiletries/medicines) No Help Needed   Shopping for groceries No Help Needed   Driving No Help Needed   Climbing a flight of stairs No Help Needed   Getting to places beyond walking distances No Help Needed

## 2022-09-19 ENCOUNTER — OFFICE VISIT (OUTPATIENT)
Dept: NEUROSURGERY | Age: 32
End: 2022-09-19
Payer: COMMERCIAL

## 2022-09-19 VITALS
SYSTOLIC BLOOD PRESSURE: 161 MMHG | HEART RATE: 85 BPM | DIASTOLIC BLOOD PRESSURE: 98 MMHG | OXYGEN SATURATION: 98 % | TEMPERATURE: 97.6 F | WEIGHT: 291 LBS | BODY MASS INDEX: 37.36 KG/M2 | RESPIRATION RATE: 17 BRPM

## 2022-09-19 DIAGNOSIS — I67.1 CEREBRAL ANEURYSM: Primary | ICD-10-CM

## 2022-09-19 PROCEDURE — 99205 OFFICE O/P NEW HI 60 MIN: CPT | Performed by: PSYCHIATRY & NEUROLOGY

## 2022-09-19 NOTE — H&P (VIEW-ONLY)
NEUROINTERVENTIONAL SURGERY   New Patient Visit         CONSULT INFORMATION:  Date of Service: 2022  Consultation Requested by: PCP    PATIENT IDENTIFICATION:  Patient Name: Billy Warner  : 1990      CC: aneurysm    HISTORY OF PRESENT ILLNESS:  27 y.o. RH black male referred for aneurysm. In 2017 he was admitted to [facility] as a good grade SAH. He had an oblong L PCOM aneurysm coiled and hospital stay was uneventful. He states he has had diplopia in the past other neuro intact. He had a DSA in [place] 2 years ago and a CTA at [facility] last year. Past Medical History:   Diagnosis Date    Brain aneurysm 2017       Past Surgical History:   Procedure Laterality Date    HX OTHER SURGICAL  2017    surgery for brain aneurysm       Current Outpatient Medications   Medication Sig    buPROPion XL (WELLBUTRIN XL) 150 mg tablet Take 1 Tablet by mouth every morning. amoxicillin-clavulanate (AUGMENTIN) 875-125 mg per tablet Take 1 Tablet by mouth every twelve (12) hours. ibuprofen (MOTRIN) 800 mg tablet Take 1 Tablet by mouth every eight (8) hours as needed for Pain. topiramate (TOPAMAX) 50 mg tablet Take 1 Tablet by mouth two (2) times a day. multivitamin (ONE A DAY) tablet Take 1 Tablet by mouth daily. Lactobacillus acidophilus (PROBIOTIC PO) Take  by mouth. fluticasone propionate (FLONASE) 50 mcg/actuation nasal spray 2 Sprays by Both Nostrils route daily. fexofenadine (ALLEGRA) 60 mg tablet Take 1 Tablet by mouth daily. montelukast (SINGULAIR) 10 mg tablet Take 1 Tablet by mouth daily. Omeprazole delayed release (PRILOSEC D/R) 20 mg tablet Take 1 Tablet by mouth daily. No current facility-administered medications for this visit.        No Known Allergies      Social History  Social History     Socioeconomic History    Marital status: SINGLE     Spouse name: Not on file    Number of children: Not on file    Years of education: Not on file    Highest education level: Not on file Occupational History    Not on file   Tobacco Use    Smoking status: Every Day     Packs/day: 1.00     Types: Cigarettes    Smokeless tobacco: Never   Vaping Use    Vaping Use: Former   Substance and Sexual Activity    Alcohol use: Yes     Comment: occasional     Drug use: Not Currently     Types: Cocaine    Sexual activity: Not on file   Other Topics Concern    Not on file   Social History Narrative    Not on file     Social Determinants of Health     Financial Resource Strain: Not on file   Food Insecurity: Not on file   Transportation Needs: Not on file   Physical Activity: Not on file   Stress: Not on file   Social Connections: Not on file   Intimate Partner Violence: Not on file   Housing Stability: Not on file     Social History     Substance and Sexual Activity   Drug Use Not Currently    Types: Cocaine     Family History   Problem Relation Age of Onset    Diabetes Mother     Hypertension Mother     Elevated Lipids Mother     No Known Problems Father          REVIEW OF SYSTEMS:  Neg except for above      OBJECTIVE:  Visit Vitals  BP (!) 161/98 (BP 1 Location: Left upper arm, BP Patient Position: Sitting)   Pulse 85   Temp 97.6 °F (36.4 °C) (Temporal)   Resp 17   Wt 291 lb (132 kg)   SpO2 98%   BMI 37.36 kg/m²       Physical Exam:   Gen: NAD  CV: nml s1,s2  Neuro:  A/Ox3,speech/lang inact  CN 2-12 intact, perrl, fundoscopic exam nml, VF full  5/5 throughout no drift; SILT, Parietal fxn intact  DTR sym  FTN intact        Lab Review:  Lab Results   Component Value Date    WBC 8.3 07/18/2022    HGB 14.0 07/18/2022    HCT 42.8 07/18/2022     07/18/2022    CHOL 148 04/18/2022    HDL 32 (L) 04/18/2022    ALT 28 07/18/2022    AST 12 (L) 07/18/2022     07/18/2022    K 3.7 07/18/2022     07/18/2022    CREA 1.10 07/18/2022    BUN 12 07/18/2022    CO2 25 07/18/2022    TSH 3.510 04/18/2022       Imaging:  CTA: ? Acom aneurysm.  L pcom aneurysm looks stable  DSA 2020: L pcom with about 3mm of residual aneurysm, likely acom and Staci infundibuli         IMPRESSION:  Hx of SAH s/p coil embo L PCOM aneurysm    PLAN:  - r/b/a d/w pt agrees to DSA to re-eval coiled aneurysm and signs of compaction        SUBMITTED BY:  Signed By: Justin Wood DO     Neurointerventional Surgery  [facility]    Available via [facility]    September 19, 2022

## 2022-09-19 NOTE — PROGRESS NOTES
NEUROINTERVENTIONAL SURGERY   New Patient Visit         CONSULT INFORMATION:  Date of Service: 2022  Consultation Requested by: PCP    PATIENT IDENTIFICATION:  Patient Name: Rupesh Gordon  : 1990      CC: aneurysm    HISTORY OF PRESENT ILLNESS:  27 y.o. RH black male referred for aneurysm. In 2017 he was admitted to [facility] as a good grade SAH. He had an oblong L PCOM aneurysm coiled and hospital stay was uneventful. He states he has had diplopia in the past other neuro intact. He had a DSA in [place] 2 years ago and a CTA at [facility] last year. Past Medical History:   Diagnosis Date    Brain aneurysm 2017       Past Surgical History:   Procedure Laterality Date    HX OTHER SURGICAL  2017    surgery for brain aneurysm       Current Outpatient Medications   Medication Sig    buPROPion XL (WELLBUTRIN XL) 150 mg tablet Take 1 Tablet by mouth every morning. amoxicillin-clavulanate (AUGMENTIN) 875-125 mg per tablet Take 1 Tablet by mouth every twelve (12) hours. ibuprofen (MOTRIN) 800 mg tablet Take 1 Tablet by mouth every eight (8) hours as needed for Pain. topiramate (TOPAMAX) 50 mg tablet Take 1 Tablet by mouth two (2) times a day. multivitamin (ONE A DAY) tablet Take 1 Tablet by mouth daily. Lactobacillus acidophilus (PROBIOTIC PO) Take  by mouth. fluticasone propionate (FLONASE) 50 mcg/actuation nasal spray 2 Sprays by Both Nostrils route daily. fexofenadine (ALLEGRA) 60 mg tablet Take 1 Tablet by mouth daily. montelukast (SINGULAIR) 10 mg tablet Take 1 Tablet by mouth daily. Omeprazole delayed release (PRILOSEC D/R) 20 mg tablet Take 1 Tablet by mouth daily. No current facility-administered medications for this visit.        No Known Allergies      Social History  Social History     Socioeconomic History    Marital status: SINGLE     Spouse name: Not on file    Number of children: Not on file    Years of education: Not on file    Highest education level: Not on file Occupational History    Not on file   Tobacco Use    Smoking status: Every Day     Packs/day: 1.00     Types: Cigarettes    Smokeless tobacco: Never   Vaping Use    Vaping Use: Former   Substance and Sexual Activity    Alcohol use: Yes     Comment: occasional     Drug use: Not Currently     Types: Cocaine    Sexual activity: Not on file   Other Topics Concern    Not on file   Social History Narrative    Not on file     Social Determinants of Health     Financial Resource Strain: Not on file   Food Insecurity: Not on file   Transportation Needs: Not on file   Physical Activity: Not on file   Stress: Not on file   Social Connections: Not on file   Intimate Partner Violence: Not on file   Housing Stability: Not on file     Social History     Substance and Sexual Activity   Drug Use Not Currently    Types: Cocaine     Family History   Problem Relation Age of Onset    Diabetes Mother     Hypertension Mother     Elevated Lipids Mother     No Known Problems Father          REVIEW OF SYSTEMS:  Neg except for above      OBJECTIVE:  Visit Vitals  BP (!) 161/98 (BP 1 Location: Left upper arm, BP Patient Position: Sitting)   Pulse 85   Temp 97.6 °F (36.4 °C) (Temporal)   Resp 17   Wt 291 lb (132 kg)   SpO2 98%   BMI 37.36 kg/m²       Physical Exam:   Gen: NAD  CV: nml s1,s2  Neuro:  A/Ox3,speech/lang inact  CN 2-12 intact, perrl, fundoscopic exam nml, VF full  5/5 throughout no drift; SILT, Parietal fxn intact  DTR sym  FTN intact        Lab Review:  Lab Results   Component Value Date    WBC 8.3 07/18/2022    HGB 14.0 07/18/2022    HCT 42.8 07/18/2022     07/18/2022    CHOL 148 04/18/2022    HDL 32 (L) 04/18/2022    ALT 28 07/18/2022    AST 12 (L) 07/18/2022     07/18/2022    K 3.7 07/18/2022     07/18/2022    CREA 1.10 07/18/2022    BUN 12 07/18/2022    CO2 25 07/18/2022    TSH 3.510 04/18/2022       Imaging:  CTA: ? Acom aneurysm.  L pcom aneurysm looks stable  DSA 2020: L pcom with about 3mm of residual aneurysm, likely acom and Staci infundibuli         IMPRESSION:  Hx of SAH s/p coil embo L PCOM aneurysm    PLAN:  - r/b/a d/w pt agrees to DSA to re-eval coiled aneurysm and signs of compaction        SUBMITTED BY:  Signed By: Juan Manjarrez DO     Neurointerventional Surgery  [facility]    Available via [employer])    September 19, 2022

## 2022-09-19 NOTE — PATIENT INSTRUCTIONS
Angiogram scheduled on 9/28/22 at 09:30. Please get labs done today that are presented to you at [address] [facility] in [place] [address].

## 2022-09-20 ENCOUNTER — APPOINTMENT (OUTPATIENT)
Dept: CT IMAGING | Age: 32
End: 2022-09-20
Attending: EMERGENCY MEDICINE
Payer: COMMERCIAL

## 2022-09-20 ENCOUNTER — HOSPITAL ENCOUNTER (EMERGENCY)
Age: 32
Discharge: HOME | End: 2022-09-20
Attending: EMERGENCY MEDICINE
Payer: COMMERCIAL

## 2022-09-20 VITALS
TEMPERATURE: 97.5 F | OXYGEN SATURATION: 99 % | DIASTOLIC BLOOD PRESSURE: 96 MMHG | RESPIRATION RATE: 20 BRPM | HEIGHT: 74 IN | SYSTOLIC BLOOD PRESSURE: 145 MMHG | HEART RATE: 90 BPM | BODY MASS INDEX: 37.35 KG/M2 | WEIGHT: 291.01 LBS

## 2022-09-20 DIAGNOSIS — H72.91 PERFORATION OF RIGHT TYMPANIC MEMBRANE: ICD-10-CM

## 2022-09-20 DIAGNOSIS — R42 VERTIGO: Primary | ICD-10-CM

## 2022-09-20 LAB
ALBUMIN SERPL-MCNC: 4.1 G/DL (ref 3.5–5)
ALBUMIN/GLOB SERPL: 1.1 {RATIO} (ref 1.1–2.2)
ALP SERPL-CCNC: 91 U/L (ref 45–117)
ALT SERPL-CCNC: 38 U/L (ref 12–78)
ANION GAP SERPL CALC-SCNC: 4 MMOL/L (ref 5–15)
AST SERPL-CCNC: 16 U/L (ref 15–37)
ATRIAL RATE: 77 BPM
BASOPHILS # BLD: 0.1 K/UL (ref 0–0.1)
BASOPHILS NFR BLD: 1 % (ref 0–1)
BILIRUB SERPL-MCNC: 0.3 MG/DL (ref 0.2–1)
BUN SERPL-MCNC: 10 MG/DL (ref 6–20)
BUN SERPL-MCNC: 12 MG/DL (ref 6–20)
BUN/CREAT SERPL: 10 (ref 9–20)
BUN/CREAT SERPL: 8 (ref 12–20)
CALCIUM SERPL-MCNC: 9.5 MG/DL (ref 8.5–10.1)
CALCIUM SERPL-MCNC: 9.7 MG/DL (ref 8.7–10.2)
CALCULATED P AXIS, ECG09: 47 DEGREES
CALCULATED R AXIS, ECG10: 81 DEGREES
CALCULATED T AXIS, ECG11: 44 DEGREES
CHLORIDE SERPL-SCNC: 108 MMOL/L (ref 96–106)
CHLORIDE SERPL-SCNC: 112 MMOL/L (ref 97–108)
CO2 SERPL-SCNC: 23 MMOL/L (ref 20–29)
CO2 SERPL-SCNC: 26 MMOL/L (ref 21–32)
COMMENT, HOLDF: NORMAL
CREAT SERPL-MCNC: 1.17 MG/DL (ref 0.76–1.27)
CREAT SERPL-MCNC: 1.25 MG/DL (ref 0.7–1.3)
DIAGNOSIS, 93000: NORMAL
DIFFERENTIAL METHOD BLD: NORMAL
EGFR: 84 ML/MIN/1.73
EOSINOPHIL # BLD: 0.3 K/UL (ref 0–0.4)
EOSINOPHIL NFR BLD: 4 % (ref 0–7)
ERYTHROCYTE [DISTWIDTH] IN BLOOD BY AUTOMATED COUNT: 12 % (ref 11.6–15.4)
ERYTHROCYTE [DISTWIDTH] IN BLOOD BY AUTOMATED COUNT: 12.2 % (ref 11.5–14.5)
GLOBULIN SER CALC-MCNC: 3.9 G/DL (ref 2–4)
GLUCOSE SERPL-MCNC: 102 MG/DL (ref 65–99)
GLUCOSE SERPL-MCNC: 104 MG/DL (ref 65–100)
HCT VFR BLD AUTO: 42.7 % (ref 37.5–51)
HCT VFR BLD AUTO: 43.7 % (ref 36.6–50.3)
HGB BLD-MCNC: 13.8 G/DL (ref 13–17.7)
HGB BLD-MCNC: 14 G/DL (ref 12.1–17)
IMM GRANULOCYTES # BLD AUTO: 0 K/UL (ref 0–0.04)
IMM GRANULOCYTES NFR BLD AUTO: 0 % (ref 0–0.5)
LYMPHOCYTES # BLD: 3.3 K/UL (ref 0.8–3.5)
LYMPHOCYTES NFR BLD: 44 % (ref 12–49)
MCH RBC QN AUTO: 27.8 PG (ref 26.6–33)
MCH RBC QN AUTO: 28.1 PG (ref 26–34)
MCHC RBC AUTO-ENTMCNC: 32 G/DL (ref 30–36.5)
MCHC RBC AUTO-ENTMCNC: 32.3 G/DL (ref 31.5–35.7)
MCV RBC AUTO: 86 FL (ref 79–97)
MCV RBC AUTO: 87.6 FL (ref 80–99)
MONOCYTES # BLD: 0.6 K/UL (ref 0–1)
MONOCYTES NFR BLD: 8 % (ref 5–13)
NEUTS SEG # BLD: 3.3 K/UL (ref 1.8–8)
NEUTS SEG NFR BLD: 43 % (ref 32–75)
NRBC # BLD: 0 K/UL (ref 0–0.01)
NRBC BLD-RTO: 0 PER 100 WBC
P-R INTERVAL, ECG05: 182 MS
PLATELET # BLD AUTO: 316 K/UL (ref 150–400)
PLATELET # BLD AUTO: 322 X10E3/UL (ref 150–450)
PMV BLD AUTO: 9.7 FL (ref 8.9–12.9)
POTASSIUM SERPL-SCNC: 4.1 MMOL/L (ref 3.5–5.1)
POTASSIUM SERPL-SCNC: 4.3 MMOL/L (ref 3.5–5.2)
PROT SERPL-MCNC: 8 G/DL (ref 6.4–8.2)
Q-T INTERVAL, ECG07: 384 MS
QRS DURATION, ECG06: 92 MS
QTC CALCULATION (BEZET), ECG08: 434 MS
RBC # BLD AUTO: 4.96 X10E6/UL (ref 4.14–5.8)
RBC # BLD AUTO: 4.99 M/UL (ref 4.1–5.7)
SAMPLES BEING HELD,HOLD: NORMAL
SODIUM SERPL-SCNC: 142 MMOL/L (ref 136–145)
SODIUM SERPL-SCNC: 143 MMOL/L (ref 134–144)
VENTRICULAR RATE, ECG03: 77 BPM
WBC # BLD AUTO: 7.4 X10E3/UL (ref 3.4–10.8)
WBC # BLD AUTO: 7.6 K/UL (ref 4.1–11.1)

## 2022-09-20 PROCEDURE — 36415 COLL VENOUS BLD VENIPUNCTURE: CPT

## 2022-09-20 PROCEDURE — 93005 ELECTROCARDIOGRAM TRACING: CPT

## 2022-09-20 PROCEDURE — 70450 CT HEAD/BRAIN W/O DYE: CPT

## 2022-09-20 PROCEDURE — 74011000636 HC RX REV CODE- 636: Performed by: INTERNAL MEDICINE

## 2022-09-20 PROCEDURE — 99285 EMERGENCY DEPT VISIT HI MDM: CPT

## 2022-09-20 PROCEDURE — 85025 COMPLETE CBC W/AUTO DIFF WBC: CPT

## 2022-09-20 PROCEDURE — 70496 CT ANGIOGRAPHY HEAD: CPT

## 2022-09-20 PROCEDURE — 80053 COMPREHEN METABOLIC PANEL: CPT

## 2022-09-20 RX ORDER — MECLIZINE HYDROCHLORIDE 25 MG/1
25 TABLET ORAL
Qty: 20 TABLET | Refills: 0 | Status: SHIPPED | OUTPATIENT
Start: 2022-09-20 | End: 2022-09-30

## 2022-09-20 RX ORDER — OXYMETAZOLINE HCL 0.05 %
2 SPRAY, NON-AEROSOL (ML) NASAL 2 TIMES DAILY
Qty: 1 EACH | Refills: 0 | Status: SHIPPED | OUTPATIENT
Start: 2022-09-20 | End: 2022-09-23

## 2022-09-20 RX ORDER — GUAIFENESIN 600 MG/1
600 TABLET, EXTENDED RELEASE ORAL 2 TIMES DAILY
Qty: 10 TABLET | Refills: 0 | Status: SHIPPED | OUTPATIENT
Start: 2022-09-20

## 2022-09-20 RX ADMIN — IOPAMIDOL 100 ML: 755 INJECTION, SOLUTION INTRAVENOUS at 16:38

## 2022-09-20 NOTE — ED TRIAGE NOTES
Patient is coming in with dizziness for 2 weeks but is increasing. Headache started today. Numbness and tingling started today in both feet and more so in the left side.  Patient has history of aneurysm in 2017

## 2022-09-20 NOTE — DISCHARGE INSTRUCTIONS
Please follow-up with ENT as scheduled. Thank you for allowing us to provide you with medical care today. We realize that you have many choices for your emergency care needs. We thank you for choosing [facility]. Please choose us in the future for any continued health care needs. We hope we addressed all of your medical concerns. We strive to provide excellent quality care in the Emergency Department. Anything less than excellent does not meet our expectations. The exam and treatment you received in the Emergency Department were for an emergent problem and are not intended as complete care. It is important that you follow up with a doctor, nurse practitioner, or physician's assistant for ongoing care. If your symptoms worsen or you do not improve as expected and you are unable to reach your usual health care provider, you should return to the Emergency Department. We are available 24 hours a day. Take this sheet with you when you go to your follow-up visit. If you have any problem arranging the follow-up visit, contact the Emergency Department immediately. Make an appointment your family doctor for follow up of this visit. Return to the ER if you are unable to be seen in a timely manner.

## 2022-09-20 NOTE — ED PROVIDER NOTES
26-year-old male with history of brain aneurysm status post coiling presents to the emergency department with dizziness. This has been worsening for the past couple of weeks but particularly bad today. Also Dors is sinus problems and is currently on Augmentin. The history is provided by the patient and medical records. Dizziness  This is a new problem. The current episode started more than 1 week ago. The problem has been rapidly worsening. There was no focality noted. Pertinent negatives include no agitation. There has been no fever. Pertinent negatives include no shortness of breath, no chest pain, no vomiting, no altered mental status, no headaches and no nausea. Numbness  Pertinent negatives include no agitation. Pertinent negatives include no shortness of breath, no chest pain, no vomiting, no altered mental status, no headaches and no nausea.       Past Medical History:   Diagnosis Date    Brain aneurysm 2017       Past Surgical History:   Procedure Laterality Date    HX OTHER SURGICAL  2017    surgery for brain aneurysm         Family History:   Problem Relation Age of Onset    Diabetes Mother     Hypertension Mother     Elevated Lipids Mother     No Known Problems Father        Social History     Socioeconomic History    Marital status: SINGLE     Spouse name: Not on file    Number of children: Not on file    Years of education: Not on file    Highest education level: Not on file   Occupational History    Not on file   Tobacco Use    Smoking status: Every Day     Packs/day: 1.00     Types: Cigarettes    Smokeless tobacco: Never   Vaping Use    Vaping Use: Former   Substance and Sexual Activity    Alcohol use: Yes     Comment: occasional     Drug use: Not Currently     Types: Cocaine    Sexual activity: Not on file   Other Topics Concern    Not on file   Social History Narrative    Not on file     Social Determinants of Health     Financial Resource Strain: Not on file   Food Insecurity: Not on file Transportation Needs: Not on file   Physical Activity: Not on file   Stress: Not on file   Social Connections: Not on file   Intimate Partner Violence: Not on file   Housing Stability: Not on file         ALLERGIES: Patient has no known allergies. Review of Systems   Constitutional:  Negative for fatigue and fever. HENT:  Positive for congestion. Negative for sneezing and sore throat. Respiratory:  Negative for cough and shortness of breath. Cardiovascular:  Negative for chest pain and leg swelling. Gastrointestinal:  Negative for abdominal pain, diarrhea, nausea and vomiting. Genitourinary:  Negative for difficulty urinating and dysuria. Musculoskeletal:  Negative for arthralgias and myalgias. Skin:  Negative for color change and rash. Neurological:  Positive for dizziness and numbness. Negative for weakness and headaches. Psychiatric/Behavioral:  Negative for agitation and behavioral problems. Vitals:    09/20/22 1501   BP: (!) 145/96   Pulse: 90   Resp: 20   Temp: 97.5 °F (36.4 °C)   SpO2: 99%   Weight: 132 kg (291 lb 0.1 oz)   Height: 6' 2\" (1.88 m)            Physical Exam  Vitals and nursing note reviewed. Constitutional:       General: He is not in acute distress. Appearance: Normal appearance. He is well-developed. He is not ill-appearing, toxic-appearing or diaphoretic. HENT:      Head: Normocephalic and atraumatic. Right Ear: Tympanic membrane is perforated. Left Ear: Tympanic membrane normal.      Nose: Nose normal.      Mouth/Throat:      Mouth: Mucous membranes are moist.      Pharynx: Oropharynx is clear. Eyes:      Extraocular Movements: Extraocular movements intact. Conjunctiva/sclera: Conjunctivae normal.      Pupils: Pupils are equal, round, and reactive to light. Cardiovascular:      Rate and Rhythm: Normal rate and regular rhythm. Pulses: Normal pulses. Heart sounds: No murmur heard.   Pulmonary:      Effort: Pulmonary effort is normal. No respiratory distress. Breath sounds: Normal breath sounds. No wheezing. Chest:      Chest wall: No mass or tenderness. Abdominal:      General: There is no distension. Palpations: Abdomen is soft. Tenderness: There is no abdominal tenderness. There is no guarding or rebound. Musculoskeletal:         General: No swelling, tenderness, deformity or signs of injury. Normal range of motion. Cervical back: Normal range of motion and neck supple. No rigidity. No muscular tenderness. Right lower leg: No tenderness. No edema. Left lower leg: No tenderness. No edema. Skin:     General: Skin is warm and dry. Capillary Refill: Capillary refill takes less than 2 seconds. Neurological:      General: No focal deficit present. Mental Status: He is alert and oriented to person, place, and time. Cranial Nerves: No cranial nerve deficit. Sensory: No sensory deficit. Motor: No weakness. Coordination: Coordination normal.      Gait: Gait normal.   Psychiatric:         Mood and Affect: Mood normal.         Behavior: Behavior normal.        MDM  Number of Diagnoses or Management Options  Diagnosis management comments: 78-year-old male presents as above with episodes of dizziness and concern for paresthesias. Neurologic examination emergency department is reassuring with neuroimaging that is also reassuring. I see no significant sinus disease on the CT. He has a right TM perforation. Given his recent sinus pressure and congestion I suspect he likely had an infection which caused the perforation and the perforation is causing his dizziness. Plan to treat with meclizine as well as clearance for his sinuses with instructions to follow-up with ENT as scheduled.        Amount and/or Complexity of Data Reviewed  Clinical lab tests: reviewed  Tests in the radiology section of CPT®: reviewed  Tests in the medicine section of CPT®: reviewed      ED Course as of 09/20/22 1907   Tue Sep 20, 2022   1505 3:05 PM  I have evaluated the patient as the Provider in Triage. I have reviewed His vital signs and the triage nurse assessment. I have talked with the patient and any available family and advised that I am the provider in triage and have ordered the appropriate study to initiate their work up based on the clinical presentation during my assessment. I have advised that the patient will be accommodated in the Main ED as soon as possible. I have also requested to contact the triage nurse or myself immediately if the patient experiences any changes in their condition during this brief waiting period. Boom Bentley MD    27year old male with history of aneurysm s/p coiling presented with worsening dizziness for 2 weeks. Feels paresthesias L>R. I evaluated in triage for potential code S. He has equal bilateral strength and sensation on testing. No abnormal F2N or CN deficits. [JM]   [id number]   ED EKG interpretation:  Rhythm: normal sinus rhythm. Rate (approx.): 77. Axis: normal.  ST segment:  No concerning ST elevations or depressions. This EKG was interpreted by China Andino MD,ED Provider.  [JM]      ED Course User Index  [JM] Layla Mooney MD       Procedures

## 2022-09-27 ENCOUNTER — TELEPHONE (OUTPATIENT)
Dept: NEUROSURGERY | Age: 32
End: 2022-09-27

## 2022-09-27 NOTE — TELEPHONE ENCOUNTER
Confirmed Diagnostic angiogram with patient on 9/28/2022 at [facility]. Arrival time 9:30 am.  Informed patient:  -No eating or drinking after midnight the day prior to procedure and take morning medications the morning of procedure with sips of water.   -You must have a  to drive you home upon discharge. -Recommend you have someone with you for at least 24-48 hours post procedure. No metal in hair. Patient stated understanding.

## 2022-09-28 ENCOUNTER — HOSPITAL ENCOUNTER (OUTPATIENT)
Dept: INTERVENTIONAL RADIOLOGY/VASCULAR | Age: 32
Discharge: HOME | End: 2022-09-28
Attending: PSYCHIATRY & NEUROLOGY | Admitting: PSYCHIATRY & NEUROLOGY
Payer: COMMERCIAL

## 2022-09-28 VITALS
OXYGEN SATURATION: 97 % | RESPIRATION RATE: 12 BRPM | HEART RATE: 73 BPM | BODY MASS INDEX: 37.22 KG/M2 | WEIGHT: 290 LBS | TEMPERATURE: 98 F | DIASTOLIC BLOOD PRESSURE: 80 MMHG | HEIGHT: 74 IN | SYSTOLIC BLOOD PRESSURE: 112 MMHG

## 2022-09-28 DIAGNOSIS — I67.1 CEREBRAL ANEURYSM: ICD-10-CM

## 2022-09-28 PROCEDURE — 76377 3D RENDER W/INTRP POSTPROCES: CPT | Performed by: PSYCHIATRY & NEUROLOGY

## 2022-09-28 PROCEDURE — 74011000250 HC RX REV CODE- 250: Performed by: PSYCHIATRY & NEUROLOGY

## 2022-09-28 PROCEDURE — 76942 ECHO GUIDE FOR BIOPSY: CPT

## 2022-09-28 PROCEDURE — 74011000636 HC RX REV CODE- 636

## 2022-09-28 PROCEDURE — 76937 US GUIDE VASCULAR ACCESS: CPT | Performed by: PSYCHIATRY & NEUROLOGY

## 2022-09-28 PROCEDURE — 74011250637 HC RX REV CODE- 250/637: Performed by: PSYCHIATRY & NEUROLOGY

## 2022-09-28 PROCEDURE — 77030008584 HC TOOL GDWRE DEV TERU -A

## 2022-09-28 PROCEDURE — C1894 INTRO/SHEATH, NON-LASER: HCPCS

## 2022-09-28 PROCEDURE — 99152 MOD SED SAME PHYS/QHP 5/>YRS: CPT | Performed by: PSYCHIATRY & NEUROLOGY

## 2022-09-28 PROCEDURE — 36226 PLACE CATH VERTEBRAL ART: CPT | Performed by: PSYCHIATRY & NEUROLOGY

## 2022-09-28 PROCEDURE — 74011250636 HC RX REV CODE- 250/636: Performed by: PSYCHIATRY & NEUROLOGY

## 2022-09-28 PROCEDURE — C1769 GUIDE WIRE: HCPCS

## 2022-09-28 PROCEDURE — C1887 CATHETER, GUIDING: HCPCS

## 2022-09-28 PROCEDURE — 2709999900 HC NON-CHARGEABLE SUPPLY

## 2022-09-28 PROCEDURE — 77030003394 HC NDL ART COOK -A

## 2022-09-28 PROCEDURE — 36224 PLACE CATH CAROTD ART: CPT | Performed by: PSYCHIATRY & NEUROLOGY

## 2022-09-28 RX ORDER — LIDOCAINE 40 MG/G
CREAM TOPICAL
Status: COMPLETED | OUTPATIENT
Start: 2022-09-28 | End: 2022-09-28

## 2022-09-28 RX ORDER — VERAPAMIL HYDROCHLORIDE 2.5 MG/ML
2.5 INJECTION, SOLUTION INTRAVENOUS
Status: CANCELLED | OUTPATIENT
Start: 2022-09-28 | End: 2022-09-28

## 2022-09-28 RX ORDER — SODIUM BICARBONATE 42 MG/ML
1 INJECTION, SOLUTION INTRAVENOUS
Status: COMPLETED | OUTPATIENT
Start: 2022-09-28 | End: 2022-09-28

## 2022-09-28 RX ORDER — SODIUM CHLORIDE 9 MG/ML
25 INJECTION, SOLUTION INTRAVENOUS
Status: COMPLETED | OUTPATIENT
Start: 2022-09-28 | End: 2022-09-28

## 2022-09-28 RX ORDER — FLUMAZENIL 0.1 MG/ML
0.5 INJECTION INTRAVENOUS
Status: CANCELLED | OUTPATIENT
Start: 2022-09-28

## 2022-09-28 RX ORDER — LIDOCAINE HYDROCHLORIDE 20 MG/ML
20 INJECTION, SOLUTION INFILTRATION; PERINEURAL
Status: COMPLETED | OUTPATIENT
Start: 2022-09-28 | End: 2022-09-28

## 2022-09-28 RX ORDER — SODIUM BICARBONATE 42 MG/ML
1 INJECTION, SOLUTION INTRAVENOUS
Status: CANCELLED | OUTPATIENT
Start: 2022-09-28 | End: 2022-09-28

## 2022-09-28 RX ORDER — VERAPAMIL HYDROCHLORIDE 2.5 MG/ML
2.5 INJECTION, SOLUTION INTRAVENOUS
Status: COMPLETED | OUTPATIENT
Start: 2022-09-28 | End: 2022-09-28

## 2022-09-28 RX ORDER — HEPARIN SODIUM 1000 [USP'U]/ML
2000 INJECTION, SOLUTION INTRAVENOUS; SUBCUTANEOUS
Status: COMPLETED | OUTPATIENT
Start: 2022-09-28 | End: 2022-09-28

## 2022-09-28 RX ORDER — NALOXONE HYDROCHLORIDE 0.4 MG/ML
0.4 INJECTION, SOLUTION INTRAMUSCULAR; INTRAVENOUS; SUBCUTANEOUS
Status: DISCONTINUED | OUTPATIENT
Start: 2022-09-28 | End: 2022-09-28 | Stop reason: HOSPADM

## 2022-09-28 RX ORDER — MIDAZOLAM HYDROCHLORIDE 1 MG/ML
4 INJECTION, SOLUTION INTRAMUSCULAR; INTRAVENOUS
Status: DISCONTINUED | OUTPATIENT
Start: 2022-09-28 | End: 2022-09-28 | Stop reason: HOSPADM

## 2022-09-28 RX ORDER — MIDAZOLAM HYDROCHLORIDE 1 MG/ML
.5-5 INJECTION, SOLUTION INTRAMUSCULAR; INTRAVENOUS
Status: CANCELLED | OUTPATIENT
Start: 2022-09-28

## 2022-09-28 RX ORDER — FENTANYL CITRATE 50 UG/ML
12.5-2 INJECTION, SOLUTION INTRAMUSCULAR; INTRAVENOUS
Status: CANCELLED | OUTPATIENT
Start: 2022-09-28

## 2022-09-28 RX ORDER — NALOXONE HYDROCHLORIDE 0.4 MG/ML
0.4 INJECTION, SOLUTION INTRAMUSCULAR; INTRAVENOUS; SUBCUTANEOUS
Status: CANCELLED | OUTPATIENT
Start: 2022-09-28

## 2022-09-28 RX ORDER — SODIUM CHLORIDE 9 MG/ML
50 INJECTION, SOLUTION INTRAVENOUS CONTINUOUS
Status: CANCELLED | OUTPATIENT
Start: 2022-09-28

## 2022-09-28 RX ORDER — FENTANYL CITRATE 50 UG/ML
200 INJECTION, SOLUTION INTRAMUSCULAR; INTRAVENOUS
Status: DISCONTINUED | OUTPATIENT
Start: 2022-09-28 | End: 2022-09-28 | Stop reason: HOSPADM

## 2022-09-28 RX ORDER — HEPARIN SODIUM 1000 [USP'U]/ML
2000 INJECTION, SOLUTION INTRAVENOUS; SUBCUTANEOUS
Status: CANCELLED | OUTPATIENT
Start: 2022-09-28 | End: 2022-09-28

## 2022-09-28 RX ORDER — FLUMAZENIL 0.1 MG/ML
0.5 INJECTION INTRAVENOUS
Status: DISCONTINUED | OUTPATIENT
Start: 2022-09-28 | End: 2022-09-28 | Stop reason: HOSPADM

## 2022-09-28 RX ORDER — LIDOCAINE 40 MG/G
CREAM TOPICAL
Status: CANCELLED | OUTPATIENT
Start: 2022-09-28 | End: 2022-09-28

## 2022-09-28 RX ADMIN — Medication 4000 UNITS: at 11:06

## 2022-09-28 RX ADMIN — VERAPAMIL HYDROCHLORIDE 2.5 MG: 2.5 INJECTION, SOLUTION INTRAVENOUS at 11:04

## 2022-09-28 RX ADMIN — MIDAZOLAM HYDROCHLORIDE 1 MG: 1 INJECTION, SOLUTION INTRAMUSCULAR; INTRAVENOUS at 11:00

## 2022-09-28 RX ADMIN — MIDAZOLAM HYDROCHLORIDE 1 MG: 1 INJECTION, SOLUTION INTRAMUSCULAR; INTRAVENOUS at 11:09

## 2022-09-28 RX ADMIN — NITROGLYCERIN 100 MCG: 10 INJECTION INTRAVENOUS at 11:04

## 2022-09-28 RX ADMIN — NITROGLYCERIN 1 INCH: 20 OINTMENT TOPICAL at 10:27

## 2022-09-28 RX ADMIN — HEPARIN SODIUM 2000 UNITS: 1000 INJECTION INTRAVENOUS; SUBCUTANEOUS at 11:04

## 2022-09-28 RX ADMIN — Medication 4000 UNITS: at 11:05

## 2022-09-28 RX ADMIN — SODIUM CHLORIDE 25 ML/HR: 9 INJECTION, SOLUTION INTRAVENOUS at 11:02

## 2022-09-28 RX ADMIN — LIDOCAINE HYDROCHLORIDE 3 ML: 20 INJECTION, SOLUTION INFILTRATION; PERINEURAL at 11:22

## 2022-09-28 RX ADMIN — LIDOCAINE 4%: 4 CREAM TOPICAL at 10:27

## 2022-09-28 RX ADMIN — IOPAMIDOL 96 ML: 612 INJECTION, SOLUTION INTRAVENOUS at 11:22

## 2022-09-28 RX ADMIN — Medication 4000 UNITS: at 11:04

## 2022-09-28 RX ADMIN — FENTANYL CITRATE 25 MCG: 50 INJECTION, SOLUTION INTRAMUSCULAR; INTRAVENOUS at 11:19

## 2022-09-28 RX ADMIN — SODIUM BICARBONATE 42 MG: 42 INJECTION, SOLUTION INTRAVENOUS at 11:04

## 2022-09-28 RX ADMIN — FENTANYL CITRATE 50 MCG: 50 INJECTION, SOLUTION INTRAMUSCULAR; INTRAVENOUS at 11:00

## 2022-09-28 NOTE — DISCHARGE INSTRUCTIONS
You have received sedation medications today. YOU SHOULD NOT DRIVE FOR 24 HOURS, DO NOT OPERATE HEAVY MACHINERY, DO NOT MAKE ANY LEGAL DECISIONS OR SIGN LEGAL DOCUMENTS FOR 24 HOURS. DO NOT DRINK ALCOHOL, TAKE ANY MEDICATIONS UNLESS PRESCRIBED BY YOUR DOCTOR. IF YOU ARE A CAREGIVER, SOMEONE SHOULD TAKE THAT ROLE FOR 24 HOURS. Side effects of sedation medications and other medications used today have been reviewed  Those side effects can include but are not limited to: dizziness, drowsiness, poor balance, fatigue, sleepiness. Take precautions at home to prevent falls, such as assistance with walking or stairs if allowed and /or when needed or position changes. Allergic or adverse reactions could include nausea, itching, hives, dizziness, or anything else out of the ordinary. Should you experience any of these significant changes, please call [phone] between the hours of 7:30 am and 3:30 pm or [phone] after hours. After hours, ask the  to page the X-ray Technologist, and describe the problem to the technologist. If you are experiencing chest pain, shortness of breath, altered mental state, unusual bleeding or any other emergent symptom you should call 911 immediately. Radial Access Discharge Instructions    CHECK THE CATHETER INSERTION SITE DAILY:  Remove the wrist dressing 24 hours after the procedure. You may shower 24 hours after the procedure. Wash with soap and water and pat dry. Gentle cleaning of the site with soap and water is sufficient, cover with a dry clean dressing or bandage. Do not apply creams or powders to the area. No soaking the wrist for 3 days. Leave the puncture site open to air after 24 hours post-procedure. ACTIVITY:  For the first 24 hours do not manipulate the wrist.  No lifting, pushing or pulling over 3-5 pounds with the affected wrist for 7 days and no straining the insertion site.   Do not lift grocery bags or the garbage can, do not run the vacuum  or  for 7 days. Increase activity as tolerated. CALL THE PHYSICIAN:  If the site becomes red, swollen or feels warm to the touch. If there is bleeding or drainage or if there is unusual pain at the radial site. If there is any minor oozing, you may apply a band-aid and remove after 12 hours. If the bleeding continues, hold pressure with the middle finger against the puncture site and the thumb against the back of the wrist, call 911 to be transported to the hospital.  DO NOT DRIVE YOURSELF, Amanda [address].

## 2022-09-28 NOTE — INTERVAL H&P NOTE
Update History & Physical    The Patient's recent History and Physical was reviewed with the patient and I examined the patient. There was no change. Had recent CTA for HA that was stable. Plan:  The risk, benefits, expected outcome, and alternative to the recommended procedure have been discussed with the patient. Patient understands and wants to proceed with the procedure.     Electronically signed by Harriett Homans, DO on 9/28/2022 at 8:04 AM

## 2022-09-28 NOTE — BRIEF OP NOTE
NEUROINTERVENTIONAL SURGERY POST-PROCEDURE NOTE    PROCEDURE:  Cerebral Angiogram    VESSEL(S) STUDIED:  R vert  R ICA  L ICA VESSEL(S) TREATED:  N/a      PRELIMINARY REPORT & DISPOSITION:   L Staci aneurysm coiled, stable      See official report for details    COMPLICATIONS:  None    FOLLOW-UP:  [facility] 3 years DATE OF SERVICE:  9/28/2022 11:31 AM     ATTENDING SURGEON:  VAMSI Torres DO    ANESTHESIA:   Conscious    MEDICATIONS:   See nursing record    PUNCTURE SITE:  R radial-->vasc band           Signed By: Koby Thomas DO   Neurointerventional Surgery  [facility]    September 28, 2022

## 2022-09-28 NOTE — PROGRESS NOTES
Pt arrives ambulatory to angio department accompanied by aunt and mother for diagnostic cerebral angiogram procedure. All assessments completed and consent was reviewed. Education given was regarding procedure, moderate sedation, post-procedure care and  management/follow-up. Opportunity for questions was provided and all questions and concerns were addressed. 1120: 15 mL of air to right wrist TR band. 1335: TR band removed at this time. Guaze and tegaderm in place. Patient reprots numbness to right palm; appears to be from lidocaine for procedure; neuro exam intact. Patient given copy of discharge instructions and verbalized understanding. Patient wheeled to exit via wheelchair. Patient in NAD. No bleeding noted at puncture site.

## 2022-09-29 ENCOUNTER — OFFICE VISIT (OUTPATIENT)
Dept: SLEEP MEDICINE | Age: 32
End: 2022-09-29
Payer: COMMERCIAL

## 2022-09-29 VITALS
HEIGHT: 74 IN | BODY MASS INDEX: 37.14 KG/M2 | WEIGHT: 289.4 LBS | SYSTOLIC BLOOD PRESSURE: 117 MMHG | OXYGEN SATURATION: 99 % | HEART RATE: 79 BPM | DIASTOLIC BLOOD PRESSURE: 76 MMHG

## 2022-09-29 DIAGNOSIS — G47.33 OSA (OBSTRUCTIVE SLEEP APNEA): Primary | ICD-10-CM

## 2022-09-29 PROCEDURE — 99204 OFFICE O/P NEW MOD 45 MIN: CPT | Performed by: SPECIALIST

## 2022-09-29 NOTE — PROGRESS NOTES
217 [facility] [place]. [place], [address]  Tel.  [phone]  Fax. 100 [facility] 60  [place], [address] [facility]  Tel.  [phone]  Fax. [phone] [address] Faby Sigala   Tel.  [phone]  Fax. [phone]       Chief Complaint       Chief Complaint   Patient presents with    Sleep Problem     Np dnoring/tired all the time       HPI      Joanne Lorenz is 27 y.o. male seen for evaluation of a sleep disorder. Notes that he has a variable sleep schedule. Often will work from 11 AM until 8 PM.  May retire at midnight and asleep by 1 AM.  Will get a bed between 7-8 AM.  May awaken 5-6 times during the night. Has been told of snoring described as loud. Notes frequent dreams. Describes self as tired on awakening and during the day. Reports apparent bruxism. Denies sleepwalking, nocturnal incontinence, abnormal arm/leg movements, hypnagogue hallucinations, sleep paralysis or cataplexy. The patient has not undergone diagnostic testing for the current problems. [place] Sleepiness Score: (P) 11       No Known Allergies    Current Outpatient Medications   Medication Sig Dispense Refill    guaiFENesin ER (Mucinex) 600 mg ER tablet Take 1 Tablet by mouth two (2) times a day. 10 Tablet 0    buPROPion XL (WELLBUTRIN XL) 150 mg tablet Take 1 Tablet by mouth every morning. 30 Tablet 5    amoxicillin-clavulanate (AUGMENTIN) 875-125 mg per tablet Take 1 Tablet by mouth every twelve (12) hours. 14 Tablet 0    ibuprofen (MOTRIN) 800 mg tablet Take 1 Tablet by mouth every eight (8) hours as needed for Pain. 30 Tablet 1    topiramate (TOPAMAX) 50 mg tablet Take 1 Tablet by mouth two (2) times a day. 60 Tablet 5    multivitamin (ONE A DAY) tablet Take 1 Tablet by mouth daily. Lactobacillus acidophilus (PROBIOTIC PO) Take  by mouth. fluticasone propionate (FLONASE) 50 mcg/actuation nasal spray 2 Sprays by Both Nostrils route daily. fexofenadine (ALLEGRA) 60 mg tablet Take 1 Tablet by mouth daily. 90 Tablet 3    montelukast (SINGULAIR) 10 mg tablet Take 1 Tablet by mouth daily. 30 Tablet 5    Omeprazole delayed release (PRILOSEC D/R) 20 mg tablet Take 1 Tablet by mouth daily. 90 Tablet 3        He  has a past medical history of Brain aneurysm (2017). He  has a past surgical history that includes hx other surgical (2017). He family history includes Diabetes in his mother; Elevated Lipids in his mother; Hypertension in his mother; No Known Problems in his father. He  reports that he has been smoking cigarettes. He has been smoking an average of 1 pack per day. He has never used smokeless tobacco. He reports current alcohol use. He reports that he does not currently use drugs after having used the following drugs: Cocaine. Review of Systems:  Review of Systems   HENT:  Negative for hearing loss and tinnitus. Eyes:  Positive for blurred vision and double vision. Respiratory:  Negative for cough and shortness of breath. Cardiovascular:  Negative for chest pain and palpitations. Gastrointestinal:  Positive for heartburn. Genitourinary:  Negative for frequency and urgency. Musculoskeletal:  Positive for joint pain and neck pain. Negative for back pain. Neurological:  Positive for dizziness. Psychiatric/Behavioral:  Positive for depression. The patient is not nervous/anxious. Objective:   Visit Vitals  /76 (BP 1 Location: Left upper arm, BP Patient Position: Sitting)   Pulse 79   Ht 6' 2\" (1.88 m)   Wt 289 lb 6.4 oz (131.3 kg)   SpO2 99%   BMI 37.16 kg/m²     Body mass index is 37.16 kg/m². General:   Conversant, cooperative   Eyes:  Pupils equal and reactive, no nystagmus   Oropharynx:   Mallampati score IV, tongue normal   T     Neck:   No carotid bruits; Neck circ.  in \"inches\": 20.25   Chest/Lungs:  Clear on auscultation    CVS:  Normal rate, regular rhythm   Skin:  Warm to touch; no obvious rashes Neuro:  Speech fluent, face symmetrical, tongue movement normal   Psych:  Normal affect,  normal countenance        Assessment:       ICD-10-CM ICD-9-CM    1. DAMIEN (obstructive sleep apnea)  G47.33 327.23 SLEEP STUDY UNATTENDED, 4 CHANNEL        history consistent with potential sleep disordered breathing. Patient has a long soft palate. Notes frequent dreams. Sleep disordered breathing may be more prominent when supine, and/  or in rem sleep. Plan:     Orders Placed This Encounter    SLEEP STUDY UNATTENDED, 4 CHANNEL     Order Specific Question:   Reason for Exam     Answer:   snoring       * Patient has a history and examination consistent with the diagnosis of sleep apnea. *Home sleep testing was ordered for initial evaluation. * He was provided information on sleep apnea including corresponding risk factors and the importance of proper treatment. * Treatment options if indicated were reviewed today. Instructions:  Do not engage in activities requiring a normal degree of alertness if fatigue is present. The patient understands that untreated or undertreated sleep apnea could impair judgement and the ability to function normally during the day. Call or return if symptoms worsen or persist.          Manisha Ambrose MD, [facility]  Electronically signed 10/04/22       This note was created using voice recognition software. Despite editing, there may be syntax errors. This note will not be viewable in [address]

## 2022-10-11 DIAGNOSIS — I72.9 ANEURYSM (HCC): Primary | ICD-10-CM

## 2022-10-24 ENCOUNTER — VIRTUAL VISIT (OUTPATIENT)
Dept: INTERNAL MEDICINE CLINIC | Age: 32
End: 2022-10-24
Payer: COMMERCIAL

## 2022-10-24 DIAGNOSIS — B37.42 CANDIDAL BALANITIS: ICD-10-CM

## 2022-10-24 DIAGNOSIS — J02.9 PHARYNGITIS, UNSPECIFIED ETIOLOGY: Primary | ICD-10-CM

## 2022-10-24 DIAGNOSIS — J20.9 ACUTE BRONCHITIS, UNSPECIFIED ORGANISM: ICD-10-CM

## 2022-10-24 PROCEDURE — 99213 OFFICE O/P EST LOW 20 MIN: CPT | Performed by: INTERNAL MEDICINE

## 2022-10-24 RX ORDER — CHLORPHENIRAMINE MALEATE 4 MG
TABLET ORAL 2 TIMES DAILY
Qty: 15 G | Refills: 0 | Status: SHIPPED | OUTPATIENT
Start: 2022-10-24

## 2022-10-24 RX ORDER — AZITHROMYCIN 250 MG/1
TABLET, FILM COATED ORAL
Qty: 6 TABLET | Refills: 0 | Status: SHIPPED | OUTPATIENT
Start: 2022-10-24

## 2022-10-24 NOTE — PROGRESS NOTES
Aroldo Shah  Identified pt with two pt identifiers(name and ). Chief Complaint   Patient presents with    Cough    Sore Throat       Reviewed record In preparation for visit and have obtained necessary documentation. 1. Have you been to the ER, urgent care clinic or hospitalized since your last visit? No     2. Have you seen or consulted any other health care providers outside of the [address] [place] [address] since your last visit? Include any pap smears or colon screening. No    Vitals reviewed with provider. Health Maintenance reviewed:     Health Maintenance Due   Topic    COVID-19 Vaccine (3 - Booster for Pfizer series)          Wt Readings from Last 3 Encounters:   22 289 lb 6.4 oz (131.3 kg)   22 290 lb (131.5 kg)   22 291 lb 0.1 oz (132 kg)        Temp Readings from Last 3 Encounters:   22 98 °F (36.7 °C)   22 97.5 °F (36.4 °C)   22 97.6 °F (36.4 °C) (Temporal)        BP Readings from Last 3 Encounters:   22 117/76   22 112/80   22 (!) 145/96        Pulse Readings from Last 3 Encounters:   22 79   22 73   22 90      There were no vitals filed for this visit.        Learning Assessment:   :       Learning Assessment 2022   PRIMARY LEARNER Patient   HIGHEST LEVEL OF EDUCATION - PRIMARY LEARNER  GRADUATED HIGH SCHOOL OR GED   BARRIERS PRIMARY LEARNER NONE   PRIMARY LANGUAGE ENGLISH   LEARNER PREFERENCE PRIMARY DEMONSTRATION   ANSWERED BY Patient   RELATIONSHIP SELF        Depression Screening:   :       3 most recent PHQ Screens 2022   Little interest or pleasure in doing things Nearly every day   Feeling down, depressed, irritable, or hopeless More than half the days   Total Score PHQ 2 5   Trouble falling or staying asleep, or sleeping too much Nearly every day   Feeling tired or having little energy Nearly every day   Poor appetite, weight loss, or overeating Nearly every day   Feeling bad about yourself - or that you are a failure or have let yourself or your family down More than half the days   Trouble concentrating on things such as school, work, reading, or watching TV Nearly every day   Moving or speaking so slowly that other people could have noticed; or the opposite being so fidgety that others notice Several days   Thoughts of being better off dead, or hurting yourself in some way Not at all   PHQ 9 Score 20        Fall Risk Assessment:   :     No flowsheet data found. Abuse Screening:   :     No flowsheet data found.      ADL Screening:   :       ADL Assessment 4/14/2022   Feeding yourself No Help Needed   Getting from bed to chair No Help Needed   Getting dressed No Help Needed   Bathing or showering No Help Needed   Walk across the room (includes cane/walker) No Help Needed   Using the telphone No Help Needed   Taking your medications No Help Needed   Preparing meals No Help Needed   Managing money (expenses/bills) No Help Needed   Moderately strenuous housework (laundry) No Help Needed   Shopping for personal items (toiletries/medicines) No Help Needed   Shopping for groceries No Help Needed   Driving No Help Needed   Climbing a flight of stairs No Help Needed   Getting to places beyond walking distances No Help Needed

## 2022-10-24 NOTE — PROGRESS NOTES
Linda Monte is a 27 y.o. male who was seen by synchronous (real-time) audio-video technology on 10/24/2022 for Cough and Sore Throat        Assessment & Plan:   Diagnoses and all orders for this visit:    1. Pharyngitis, unspecified etiology  Will treat for presumptive strep throat and bronchitis. Instructed him to check with ENT doctor t make sure it is okay to take this antibiotic before allergy testing. Recommended he test for COVID-19 because sore throat and cough can be symptoms.  -     Start azithromycin (Zithromax Z-David) 250 mg tablet; Take 2 tablets on day 1 then 1 tablet daily on days 2-5    2. Acute bronchitis, unspecified organism  -     azithromycin (Zithromax Z-David) 250 mg tablet; Take 2 tablets on day 1 then 1 tablet daily on days 2-5    3. Candidal balanitis  -     Start clotrimazole (LOTRIMIN) 1 % topical cream; Apply to affected area two (2) times a day. Follow-up and Dispositions    Return if symptoms worsen or fail to improve, for Scheduled appointment with Joel GARCIA on 3/16/23.           712  Subjective:     Complains of 2 day history of sore throat and cough productive of brownish-green mucus. Nasal congestion, tonsils enlarged, has coating at back of throat. Feels like he is at the start of having strep throat. Gets every 1-2 yrs. His ENT doctor has him off all his allergy and sinus medications because he has allergy testing in 2 days and was told to be off these medications for a week. Increased night sweats. Denies fevers, chills, SOB, or wheezing. Has not tested for COVID recently. Also complains of getting a yeast infection after finishing Augmentin for sinus infection. Had itchy red rash at penis, not as bad today but still present. Prior to Admission medications    Medication Sig Start Date End Date Taking? Authorizing Provider   buPROPion XL (WELLBUTRIN XL) 150 mg tablet Take 1 Tablet by mouth every morning.  9/15/22  Yes Amy Lovtet PA-C   ibuprofen (MOTRIN) 800 mg tablet Take 1 Tablet by mouth every eight (8) hours as needed for Pain. 9/13/22  Yes Viviann Kawasaki, PA-C   topiramate (TOPAMAX) 50 mg tablet Take 1 Tablet by mouth two (2) times a day. 4/29/22  Yes Viviann Kawasaki, PA-C   Lactobacillus acidophilus (PROBIOTIC PO) Take  by mouth. Yes Provider, Historical   guaiFENesin ER (Mucinex) 600 mg ER tablet Take 1 Tablet by mouth two (2) times a day. Patient not taking: Reported on 10/24/2022 9/20/22   Kemi Barajas MD   multivitamin (ONE A DAY) tablet Take 1 Tablet by mouth daily. Provider, Historical   fluticasone propionate (FLONASE) 50 mcg/actuation nasal spray 2 Sprays by Both Nostrils route daily. Patient not taking: Reported on 10/24/2022    Provider, Historical   fexofenadine (ALLEGRA) 60 mg tablet Take 1 Tablet by mouth daily. Patient not taking: Reported on 10/24/2022 4/14/22   Viviann Kawasaki, PA-C   montelukast (SINGULAIR) 10 mg tablet Take 1 Tablet by mouth daily. Patient not taking: Reported on 10/24/2022 4/14/22   Viviann Kawasaki, PA-C   Omeprazole delayed release (PRILOSEC D/R) 20 mg tablet Take 1 Tablet by mouth daily.  4/14/22   Viviann Kawasaki, PA-C     Patient Active Problem List   Diagnosis Code    Non-seasonal allergic rhinitis J30.89    Cerebral aneurysm I67.1    Class 2 obesity due to excess calories without serious comorbidity with body mass index (BMI) of 37.0 to 37.9 in adult E66.09, Z68.37    Snoring R06.83    Family history of diabetes mellitus Z83.3    Orgasmic dysfunction [id number]    Mild intermittent asthma without complication L99.21    Hiatal hernia K44.9    Gastroesophageal reflux disease without esophagitis K21.9    Abnormal blood sugar R73.09    Low testosterone R79.89    Seasonal allergic rhinitis due to pollen J30.1    Abscess L02.91    Dysthymia F34.1    Tobacco use Z72.0         Objective:     Patient-Reported Vitals 4/29/2022   Patient-Reported Weight 304      General: alert, cooperative, no distress   Mental status: normal mood, behavior, speech, dress, motor activity, and thought processes, able to follow commands   HENT: NCAT   Neck: no visualized mass   Resp: no respiratory distress   Neuro: no gross deficits   Skin: no discoloration or lesions of concern on visible areas   Psychiatric: normal affect, consistent with stated mood, no evidence of hallucinations     Additional exam findings: obese      We discussed the expected course, resolution and complications of the diagnosis(es) in detail. Medication risks, benefits, costs, interactions, and alternatives were discussed as indicated. I advised him to contact the office if his condition worsens, changes or fails to improve as anticipated. He expressed understanding with the diagnosis(es) and plan. THIS VISIT WAS COMPLETED VIRTUALLY USING MY CHART TELEMEDICINE     Gregory Mode, was evaluated through a synchronous (real-time) audio-video encounter. The patient (or guardian if applicable) is aware that this is a billable service, which includes applicable co-pays. This Virtual Visit was conducted with patient's (and/or legal guardian's) consent. The visit was conducted pursuant to the emergency declaration under the [facility], [address] [place] [address] authority and the [employer] and [employer] General Act. Patient identification was verified, and a caregiver was present when appropriate. The patient was located at: Home: [address] [address]  The provider was located at:  Facility (Appt Department): [address] [address]        Mare Sanchez MD

## 2022-10-31 DIAGNOSIS — J30.89 NON-SEASONAL ALLERGIC RHINITIS, UNSPECIFIED TRIGGER: ICD-10-CM

## 2022-10-31 DIAGNOSIS — J45.20 MILD INTERMITTENT ASTHMA WITHOUT COMPLICATION: ICD-10-CM

## 2022-10-31 RX ORDER — MONTELUKAST SODIUM 10 MG/1
10 TABLET ORAL DAILY
Qty: 30 TABLET | Refills: 5 | Status: SHIPPED | OUTPATIENT
Start: 2022-10-31 | End: 2022-11-28 | Stop reason: SDUPTHER

## 2022-11-10 ENCOUNTER — TELEPHONE (OUTPATIENT)
Dept: INTERNAL MEDICINE CLINIC | Age: 32
End: 2022-11-10

## 2022-11-10 DIAGNOSIS — A63.0 ANAL WARTS: Primary | ICD-10-CM

## 2022-11-10 NOTE — TELEPHONE ENCOUNTER
Pt is calling bc he was given a referral to a dermatologist, but he actually needs a colon and rectal surgeon referral. He has one picked out already that is w/ musa griffiths below. He was unable to provide me with a fax, but had his phone number. Donna Patino    FAX:  Phone: [phone]    Or if angelique had another colon and rectal doctor that he recommended he was open to hearing suggestions. Pt also says he  has something personal he would like to discuss w/ antonieta. I informed the pt that Jareth Brownlee would be the one to call first to discuss potential future solutions and help him with what he needed and see if antonieta was still needed.

## 2022-11-10 NOTE — TELEPHONE ENCOUNTER
I called the patient and verified them by name and date of birth. He confirmed that needs to see a colon & rectal specialist for the anal warts and not a dermatologists. He will need to have them removed. I informed him to try calling them tomorrow afternoon for an appointment if they do not work out let me know and I can send him to another practice. He stated understanding and no longer needs a call back from Jacquelyn López.

## 2022-11-28 DIAGNOSIS — K44.9 HIATAL HERNIA: ICD-10-CM

## 2022-11-28 DIAGNOSIS — J30.89 NON-SEASONAL ALLERGIC RHINITIS, UNSPECIFIED TRIGGER: ICD-10-CM

## 2022-11-28 DIAGNOSIS — K21.9 GASTROESOPHAGEAL REFLUX DISEASE WITHOUT ESOPHAGITIS: ICD-10-CM

## 2022-11-28 DIAGNOSIS — F34.1 DYSTHYMIA: ICD-10-CM

## 2022-11-28 DIAGNOSIS — J45.20 MILD INTERMITTENT ASTHMA WITHOUT COMPLICATION: ICD-10-CM

## 2022-11-28 DIAGNOSIS — E66.01 MORBID OBESITY (HCC): ICD-10-CM

## 2022-11-28 NOTE — TELEPHONE ENCOUNTER
Pt says his insurance is no longer covering the supply quantity he needs at [facility] so he needs to be switched to [facility].

## 2022-11-28 NOTE — TELEPHONE ENCOUNTER
Called pt , verified name and . Pt stated that his insurance advised him to change pharmacies due to medication quantity issues. Pharmacy has been switched and Rx have been sent to provider for refills.

## 2022-11-29 RX ORDER — MONTELUKAST SODIUM 10 MG/1
10 TABLET ORAL DAILY
Qty: 30 TABLET | Refills: 5 | Status: SHIPPED | OUTPATIENT
Start: 2022-11-29

## 2022-11-29 RX ORDER — FEXOFENADINE HCL 60 MG
60 TABLET ORAL DAILY
Qty: 90 TABLET | Refills: 3 | Status: SHIPPED | OUTPATIENT
Start: 2022-11-29

## 2022-11-29 RX ORDER — PHENOL/SODIUM PHENOLATE
20 AEROSOL, SPRAY (ML) MUCOUS MEMBRANE DAILY
Qty: 90 TABLET | Refills: 3 | Status: SHIPPED | OUTPATIENT
Start: 2022-11-29

## 2022-11-29 RX ORDER — TOPIRAMATE 50 MG/1
50 TABLET, FILM COATED ORAL 2 TIMES DAILY
Qty: 60 TABLET | Refills: 5 | Status: SHIPPED | OUTPATIENT
Start: 2022-11-29

## 2022-11-29 RX ORDER — BUPROPION HYDROCHLORIDE 150 MG/1
150 TABLET ORAL
Qty: 30 TABLET | Refills: 5 | Status: SHIPPED | OUTPATIENT
Start: 2022-11-29

## 2022-11-30 DIAGNOSIS — J45.20 MILD INTERMITTENT ASTHMA WITHOUT COMPLICATION: ICD-10-CM

## 2022-11-30 DIAGNOSIS — J30.89 NON-SEASONAL ALLERGIC RHINITIS, UNSPECIFIED TRIGGER: ICD-10-CM

## 2022-11-30 RX ORDER — MONTELUKAST SODIUM 10 MG/1
10 TABLET ORAL DAILY
Qty: 30 TABLET | Refills: 5 | Status: CANCELLED | OUTPATIENT
Start: 2022-11-30

## 2022-11-30 NOTE — TELEPHONE ENCOUNTER
Requested Prescriptions     Pending Prescriptions Disp Refills    montelukast (SINGULAIR) 10 mg tablet 30 Tablet 5     Sig: Take 1 Tablet by mouth daily.

## 2022-12-02 ENCOUNTER — OFFICE VISIT (OUTPATIENT)
Dept: ENDOCRINOLOGY | Age: 32
End: 2022-12-02
Payer: COMMERCIAL

## 2022-12-02 VITALS
DIASTOLIC BLOOD PRESSURE: 73 MMHG | WEIGHT: 275 LBS | BODY MASS INDEX: 35.29 KG/M2 | HEIGHT: 74 IN | SYSTOLIC BLOOD PRESSURE: 116 MMHG | HEART RATE: 84 BPM

## 2022-12-02 DIAGNOSIS — R79.89 LOW TESTOSTERONE: Primary | ICD-10-CM

## 2022-12-02 DIAGNOSIS — Z78.9 TRANSGENDER: ICD-10-CM

## 2022-12-02 RX ORDER — METFORMIN HYDROCHLORIDE 500 MG/1
TABLET ORAL DAILY
COMMUNITY

## 2022-12-02 NOTE — PROGRESS NOTES
Chief Complaint   Patient presents with    Hypogonadism       * New Patient Visit    Low energy, always sleepy  One time free testosterone was low  To get home DAMIEN test next week     General:  Onset: low energy for several years   Was transitioning but stopped in '17 after brain aneurysm (estrogen and testosterone blockers)   Was smoking at that time     Symptoms: (see below)  (Prior) Treatment:  NONE    ROS  Suggestive  Low Libido:  YES  Erectile Dysfunction: NO (feels semen volume low)  Spontaneous Erections: NO  Fertility Issues: NO  Height Loss/low-trama fracture/low BMD: NO  Hot Flushes/Sweats: NO  (has always sweated a lot; but less more recently, more cold recently    Nonspecific  Decreased Energy: YES  Decreased Motivation: YES  Mood Changes: YES (now on wellbutrin)  Poor Concentration/Memory: YES (concentration)  Sleep Disturbance/DAMIEN: - getting tested next week  Change in testicle size: NO  Change in hair pattern: YES - less hairy (beard/legs)    Other:  Headaches: YES (sinus problems) - frontal (Vascular migraines)  Head Trauma: NO  Testicular Trauma: NO  Vision Changes: NO (double vision corrected by glasses) -has had imaging of head in past   Increased breast tissue: NO  H/o steroids/opiods/anabolic steroids: NO  Major illnesses/diseases: NO  Obesity: YES  Etoh/Ani use: NO    Labs  Lab Results   Component Value Date/Time    TSH 3.510 04/18/2022 07:14 AM         Lab Results   Component Value Date/Time    Testosterone 430 07/12/2022 08:15 AM    Free testosterone (Direct) 11.1 07/12/2022 08:15 AM      4/18/22 Testosterone 465, Free 4.1 (direct)     Past Medical History:   Diagnosis Date    Brain aneurysm 2017      Past Surgical History:   Procedure Laterality Date    HX OTHER SURGICAL  2017    surgery for brain aneurysm        Social History     Tobacco Use    Smoking status: Every Day     Packs/day: 1.00     Types: Cigarettes    Smokeless tobacco: Never   Substance Use Topics    Alcohol use:  Yes Comment: occasional       Employer:  [employer]         Blood pressure 116/73, pulse 84, height 6' 2\" (1.88 m), weight 275 lb (124.7 kg). Weight Metrics 12/2/2022 9/29/2022 9/28/2022 9/20/2022 9/19/2022 9/15/2022 7/6/2022   Weight 275 lb 289 lb 6.4 oz 290 lb 291 lb 0.1 oz 291 lb 288 lb 12.8 oz 294 lb 1.5 oz   BMI 35.31 kg/m2 37.16 kg/m2 37.23 kg/m2 37.36 kg/m2 37.36 kg/m2 37.08 kg/m2 37.76 kg/m2        EXAM:  - not done today     Assessment/Plan:   1. Low testosterone  One free testosterone that was low - repeat was normal; DIRECT free testosterone is notoriously inaccurate  Repeat levels with most accurate testosterone assays, plus other pituitary hormone  Head CT scan, although not pituitary specific, did not note any pituitary abnormalities  Is getting home sleep eval equipment next week to rule out   Question even using testosterone products (or clomid) if may want to resume transitioning in future    2. Transgender   Transition halted when had brain aneurysm  Advised if wishes to pursue again can refer to one of my colleagues      Orders Placed This Encounter    TESTOSTERONE, F EQLIB+T LC/MS    FSH AND LH    PROLACTIN    T4, FREE    INSULIN-LIKE GROWTH FACTOR 1    CORTISOL     Follow-up and Dispositions    Return for --- follow up to be determined once labs are back.

## 2022-12-02 NOTE — LETTER
12/2/2022    Patient: Ranjan Head   YOB: 1990   Date of Visit: 12/2/2022     Shabnam Huber PA-C  [address]  [address] [facility] [address]    Dear Shabnam Huber PA-C,      Thank you for referring Mr. Ranjan Head to [facility] for evaluation. My notes for this consultation are attached. If you have questions, please do not hesitate to call me. I look forward to following your patient along with you.       Sincerely,    Trace Michael MD

## 2022-12-03 LAB
CORTIS SERPL-MCNC: 9.1 UG/DL
FSH SERPL-ACNC: 2.7 MIU/ML (ref 1.5–12.4)
LH SERPL-ACNC: 6.9 MIU/ML (ref 1.7–8.6)
PROLACTIN SERPL-MCNC: 9.9 NG/ML (ref 4–15.2)
T4 FREE SERPL-MCNC: 1.26 NG/DL (ref 0.82–1.77)
TESTOST FREE MFR SERPL: NORMAL %
TESTOST FREE SERPL-MCNC: NORMAL PG/ML
TESTOST SERPL-MCNC: NORMAL NG/DL

## 2022-12-04 LAB — IGF-I SERPL-MCNC: 153 NG/ML (ref 95–290)

## 2022-12-05 ENCOUNTER — OFFICE VISIT (OUTPATIENT)
Dept: SLEEP MEDICINE | Age: 32
End: 2022-12-05

## 2022-12-05 DIAGNOSIS — G47.33 OSA (OBSTRUCTIVE SLEEP APNEA): Primary | ICD-10-CM

## 2022-12-05 NOTE — PROGRESS NOTES
217 [facility] Than[place]. [place], [address]  Tel.  [phone]  Fax. 100 [facility] 60  [place], 200 [address] [facility]  Tel.  [phone]  Fax. [phone] [address] [facility] Faby Sigala   Tel.  [phone]  Fax. [phone]       S>Vega Wilkes is a 27 y.o. male seen today to receive a home sleep testing unit (HST). Patient was educated on proper hookup and operation of the HST. Instruction forms and documentation were reviewed and signed. The patient demonstrated good understanding of the HST.    O>    There were no vitals taken for this visit. A>  No diagnosis found. P>  General information regarding operations and maintenance of the device was provided. He was provided information on sleep apnea including coresponding risk factors and the importance of proper treatment. Follow-up appointment was made to return the HST. He will be contacted once the results have been reviewed. He was asked to contact our office for any problems regarding his home sleep test study.   [facility] 26 [id number]

## 2022-12-06 ENCOUNTER — HOSPITAL ENCOUNTER (OUTPATIENT)
Dept: SLEEP MEDICINE | Age: 32
Discharge: HOME | End: 2022-12-06
Payer: COMMERCIAL

## 2022-12-06 ENCOUNTER — TELEPHONE (OUTPATIENT)
Dept: SLEEP MEDICINE | Age: 32
End: 2022-12-06

## 2022-12-06 DIAGNOSIS — G47.33 OSA (OBSTRUCTIVE SLEEP APNEA): Primary | ICD-10-CM

## 2022-12-06 PROCEDURE — 95806 SLEEP STUDY UNATT&RESP EFFT: CPT | Performed by: SPECIALIST

## 2022-12-12 NOTE — TELEPHONE ENCOUNTER
Reviewed sleep study results with patient. He expressed understanding. 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 Will defer to Dr. Zaire Ortiz for further recommendations.

## 2022-12-28 ENCOUNTER — TELEPHONE (OUTPATIENT)
Dept: SLEEP MEDICINE | Age: 32
End: 2022-12-28

## 2022-12-29 NOTE — TELEPHONE ENCOUNTER
Dr. Johnny Serra,     Please advice per Win's last message if it is ok for patient to do another HST or if you want him to do an inlab study.

## 2023-01-13 ENCOUNTER — TELEPHONE (OUTPATIENT)
Dept: INTERNAL MEDICINE CLINIC | Age: 33
End: 2023-01-13

## 2023-01-13 NOTE — TELEPHONE ENCOUNTER
The patient called and verified his name and date of birth. He stated that he recently has surgery and was prescribed OxyContin and became hooked. He is trying to get off but completely stopped it and is now having withdrawals. Symptoms consists of vomiting, chills & body aches. He did some research and found some information on Suboxone and wanted to know if Cam would prescribe it. I informed him that I do not think we prescribe that medication but I will check with Cam to see what other options are.

## 2023-01-16 NOTE — TELEPHONE ENCOUNTER
I called the patient and verified them by name and date of birth. I informed him on the message from Cam. He stated understanding and found someone to prescribe the Suboxone.

## 2023-01-16 NOTE — TELEPHONE ENCOUNTER
Please notify patient that we do not prescribe that. Please call him and see how his symptoms are doing.

## 2023-01-17 NOTE — TELEPHONE ENCOUNTER
Spoke to patient  He is severely depressed, can we refer to rashad for substance abuse disorder, major depressive disorder severe    Can we call to move appt up

## 2023-01-17 NOTE — TELEPHONE ENCOUNTER
I called the patient and verified them by name and date of birth. I informed him on the appointment openings at 02.03 and that I am in the process of sending his referral. He stated understanding and had no further questions.

## 2023-01-19 ENCOUNTER — PATIENT MESSAGE (OUTPATIENT)
Dept: INTERNAL MEDICINE CLINIC | Age: 33
End: 2023-01-19

## 2023-01-20 ENCOUNTER — VIRTUAL VISIT (OUTPATIENT)
Dept: INTERNAL MEDICINE CLINIC | Age: 33
End: 2023-01-20
Payer: COMMERCIAL

## 2023-01-20 DIAGNOSIS — F32.1 CURRENT MODERATE EPISODE OF MAJOR DEPRESSIVE DISORDER WITHOUT PRIOR EPISODE (HCC): Primary | ICD-10-CM

## 2023-01-20 PROCEDURE — 99213 OFFICE O/P EST LOW 20 MIN: CPT | Performed by: PHYSICIAN ASSISTANT

## 2023-01-20 RX ORDER — BUPROPION HYDROCHLORIDE 200 MG/1
200 TABLET, EXTENDED RELEASE ORAL 2 TIMES DAILY
Qty: 30 TABLET | Refills: 5 | Status: SHIPPED | OUTPATIENT
Start: 2023-01-20

## 2023-01-20 NOTE — PROGRESS NOTES
Raydell Aschoff  Identified pt with two pt identifiers(name and ). No chief complaint on file. Reviewed record In preparation for visit and have obtained necessary documentation. 1. Have you been to the ER, urgent care clinic or hospitalized since your last visit? No     2. Have you seen or consulted any other health care providers outside of the [address] [place] [address] since your last visit? Include any pap smears or colon screening. No    Patient does not have an advance directive. Vitals reviewed with provider. Health Maintenance reviewed:     Health Maintenance Due   Topic    COVID-19 Vaccine (3 - Booster for Pfizer series)          Wt Readings from Last 3 Encounters:   22 275 lb (124.7 kg)   22 289 lb 6.4 oz (131.3 kg)   22 290 lb (131.5 kg)        Temp Readings from Last 3 Encounters:   22 98 °F (36.7 °C)   22 97.5 °F (36.4 °C)   22 97.6 °F (36.4 °C) (Temporal)        BP Readings from Last 3 Encounters:   22 116/73   22 117/76   22 112/80        Pulse Readings from Last 3 Encounters:   22 84   22 79   22 73      There were no vitals filed for this visit.        Learning Assessment:   :       Learning Assessment 2022   PRIMARY LEARNER Patient   HIGHEST LEVEL OF EDUCATION - PRIMARY LEARNER  GRADUATED HIGH SCHOOL OR GED   BARRIERS PRIMARY LEARNER NONE   PRIMARY LANGUAGE ENGLISH   LEARNER PREFERENCE PRIMARY DEMONSTRATION   ANSWERED BY Patient   RELATIONSHIP SELF        Depression Screening:   :       3 most recent PHQ Screens 2022   Little interest or pleasure in doing things Nearly every day   Feeling down, depressed, irritable, or hopeless More than half the days   Total Score PHQ 2 5   Trouble falling or staying asleep, or sleeping too much Nearly every day   Feeling tired or having little energy Nearly every day   Poor appetite, weight loss, or overeating Nearly every day   Feeling bad about yourself - or that you are a failure or have let yourself or your family down More than half the days   Trouble concentrating on things such as school, work, reading, or watching TV Nearly every day   Moving or speaking so slowly that other people could have noticed; or the opposite being so fidgety that others notice Several days   Thoughts of being better off dead, or hurting yourself in some way Not at all   PHQ 9 Score 20        Fall Risk Assessment:   :     No flowsheet data found. Abuse Screening:   :     No flowsheet data found.      ADL Screening:   :       ADL Assessment 4/14/2022   Feeding yourself No Help Needed   Getting from bed to chair No Help Needed   Getting dressed No Help Needed   Bathing or showering No Help Needed   Walk across the room (includes cane/walker) No Help Needed   Using the telphone No Help Needed   Taking your medications No Help Needed   Preparing meals No Help Needed   Managing money (expenses/bills) No Help Needed   Moderately strenuous housework (laundry) No Help Needed   Shopping for personal items (toiletries/medicines) No Help Needed   Shopping for groceries No Help Needed   Driving No Help Needed   Climbing a flight of stairs No Help Needed   Getting to places beyond walking distances No Help Needed

## 2023-01-20 NOTE — PROGRESS NOTES
Shruthi Ba is a 27 y.o. male who was seen by synchronous (real-time) audio-video technology on 1/20/2023 for Letter for School/Work (Pain - 0 )        Assessment & Plan:   Diagnoses and all orders for this visit:    1. Current moderate episode of major depressive disorder without prior episode (HCC)  -     buPROPion SR (WELLBUTRIN, ZYBAN) 200 mg SR tablet; Take 1 Tablet by mouth two (2) times a day. Patient is doing better back on Wellbutrin 150. Had new depressive episode that lead to YAMILETH from work for 2 weeks. Given note for work proving need for YAMILETH. Plan to increase to 200 mg Wellbutrin. Ethos was not covered by insurance so he is contacting his insurance and will looking into [employer] through the city as well for any crisis groups they have if symptoms worse. No SI. Will call if sx's worsen    The complexity of medical decision making for this visit is moderate     I spent at least 15 minutes on this visit with this established patient. 712  Subjective:   Patient seen via [employer] virtual visit for Letter to explain YAMILETH from work. He has had acute episode of depression leading to him not being able to work or leave house easily. He notes after surgery on rectum to removal wart from anal area he was having acute pains and had incident where he spilled and spoiled his pain medicine and was not able to obtain sufficient pain control. He notes he believes he then got hooked on pain medicines he had found from others and began getting suboxone therapy to help recover from what he described as an acute addiction to the opiate. He stopped using his wellbutrin daily as he should and began having acute depression. He was not leaving his bed much or his home and not able to work through depression. He is now back on wellbutrin daily and is doing better. He notes the current dose is not helping him quit smoking much but is feeling better from mental health stand point.  Ethos was tried and recommended but his insurance did not cover so he is currently looking for other mental health resources for psychiatry. He has no current SI. Prior to Admission medications    Medication Sig Start Date End Date Taking? Authorizing Provider   buPROPion SR (WELLBUTRIN, ZYBAN) 200 mg SR tablet Take 1 Tablet by mouth two (2) times a day. 1/20/23  Yes Elisa Smith PA-C   metFORMIN (GLUCOPHAGE) 500 mg tablet Take  by mouth daily. Yes Provider, Historical   Omeprazole delayed release (PRILOSEC D/R) 20 mg tablet Take 1 Tablet by mouth daily. 11/29/22  Yes Elisa Smith PA-C   topiramate (TOPAMAX) 50 mg tablet Take 1 Tablet by mouth two (2) times a day. 11/29/22  Yes Elisa Smith PA-C   montelukast (SINGULAIR) 10 mg tablet Take 1 Tablet by mouth daily. 11/29/22  Yes Elisa Smith PA-C   fexofenadine (ALLEGRA) 60 mg tablet Take 1 Tablet by mouth daily. 11/29/22  Yes Elisa Smith PA-C   ibuprofen (MOTRIN) 800 mg tablet Take 1 Tablet by mouth every eight (8) hours as needed for Pain. 9/13/22  Yes Elisa Smith PA-C   multivitamin (ONE A DAY) tablet Take 1 Tablet by mouth daily. Yes Provider, Historical   Lactobacillus acidophilus (PROBIOTIC PO) Take  by mouth. Yes Provider, Historical   buPROPion XL (WELLBUTRIN XL) 150 mg tablet Take 1 Tablet by mouth every morning.  11/29/22 1/20/23  Elisa Smith PA-C     Patient Active Problem List   Diagnosis Code    Non-seasonal allergic rhinitis J30.89    Cerebral aneurysm I67.1    Class 2 obesity due to excess calories without serious comorbidity with body mass index (BMI) of 37.0 to 37.9 in adult E66.09, Z68.37    Snoring R06.83    Family history of diabetes mellitus Z83.3    Orgasmic dysfunction [id number]    Mild intermittent asthma without complication W23.28    Hiatal hernia K44.9    Gastroesophageal reflux disease without esophagitis K21.9    Abnormal blood sugar R73.09    Low testosterone R79.89    Seasonal allergic rhinitis due to pollen J30.1    Abscess L02.91    Dysthymia F34.1    Tobacco use Z72.0     Patient Active Problem List    Diagnosis Date Noted    Abscess 09/15/2022    Dysthymia 09/15/2022    Tobacco use 09/15/2022    Abnormal blood sugar 04/29/2022    Low testosterone 04/29/2022    Seasonal allergic rhinitis due to pollen 04/29/2022    Non-seasonal allergic rhinitis 04/14/2022    Cerebral aneurysm 04/14/2022    Class 2 obesity due to excess calories without serious comorbidity with body mass index (BMI) of 37.0 to 37.9 in adult 04/14/2022    Snoring 04/14/2022    Family history of diabetes mellitus 04/14/2022    Orgasmic dysfunction 04/14/2022    Mild intermittent asthma without complication 62/46/0625    Hiatal hernia 04/14/2022    Gastroesophageal reflux disease without esophagitis 04/14/2022     Current Outpatient Medications   Medication Sig Dispense Refill    buPROPion SR (WELLBUTRIN, ZYBAN) 200 mg SR tablet Take 1 Tablet by mouth two (2) times a day. 30 Tablet 5    metFORMIN (GLUCOPHAGE) 500 mg tablet Take  by mouth daily. Omeprazole delayed release (PRILOSEC D/R) 20 mg tablet Take 1 Tablet by mouth daily. 90 Tablet 3    topiramate (TOPAMAX) 50 mg tablet Take 1 Tablet by mouth two (2) times a day. 60 Tablet 5    montelukast (SINGULAIR) 10 mg tablet Take 1 Tablet by mouth daily. 30 Tablet 5    fexofenadine (ALLEGRA) 60 mg tablet Take 1 Tablet by mouth daily. 90 Tablet 3    ibuprofen (MOTRIN) 800 mg tablet Take 1 Tablet by mouth every eight (8) hours as needed for Pain. 30 Tablet 1    multivitamin (ONE A DAY) tablet Take 1 Tablet by mouth daily. Lactobacillus acidophilus (PROBIOTIC PO) Take  by mouth.        No Known Allergies  Past Medical History:   Diagnosis Date    Brain aneurysm 2017     Past Surgical History:   Procedure Laterality Date    HX OTHER SURGICAL  2017    surgery for brain aneurysm     Family History   Problem Relation Age of Onset    Diabetes Mother     Hypertension Mother     Elevated Lipids Mother     No Known Problems Father      Social History     Tobacco Use    Smoking status: Every Day     Packs/day: 1.00     Types: Cigarettes    Smokeless tobacco: Never   Substance Use Topics    Alcohol use: Yes     Comment: occasional        Review of Systems   Constitutional:  Negative for chills, fever and weight loss. Respiratory:  Negative for cough, shortness of breath and wheezing. Cardiovascular:  Negative for leg swelling. Gastrointestinal:  Negative for constipation, diarrhea, nausea and vomiting. Genitourinary:  Negative for dysuria and frequency. Skin:  Negative for rash. Neurological:  Negative for weakness. Psychiatric/Behavioral:  Positive for depression and substance abuse. Negative for suicidal ideas. All other systems reviewed and are negative. Objective:     Patient-Reported Vitals 1/20/2023   Patient-Reported Weight 262lb      General: alert, cooperative, no distress   Mental  status: normal mood, behavior, speech, dress, motor activity, and thought processes, able to follow commands   HENT: NCAT   Neck: no visualized mass   Resp: no respiratory distress   Neuro: no gross deficits   Skin: no discoloration or lesions of concern on visible areas   Psychiatric: normal affect, consistent with stated mood, no evidence of hallucinations     Additional exam findings: We discussed the expected course, resolution and complications of the diagnosis(es) in detail. Medication risks, benefits, costs, interactions, and alternatives were discussed as indicated. I advised him to contact the office if his condition worsens, changes or fails to improve as anticipated. He expressed understanding with the diagnosis(es) and plan. Bryant Mcmillan, was evaluated through a synchronous (real-time) audio-video encounter. The patient (or guardian if applicable) is aware that this is a billable service, which includes applicable co-pays. This Virtual Visit was conducted with patient's (and/or legal guardian's) consent. The visit was conducted pursuant to the emergency declaration under the [facility], [address] [place] [employer] authority and the [employer] and [employer] General Act. Patient identification was verified, and a caregiver was present when appropriate. The patient was located at: Home: [address] [address]  The provider was located at:  Facility (Appt Department): [facility] [facility] [address]        Sebas Bernstein PA-C

## 2023-01-20 NOTE — TELEPHONE ENCOUNTER
After speaking with Cam, I called the patient and verified them by name and date of birth. I informed the patient that we will have to see him first to get the note. He stated understanding and scheduled an appointment for today.

## 2023-01-20 NOTE — LETTER
NOTIFICATION RETURN TO WORK / SCHOOL    1/20/2023 12:25 PM    Mr. Henri Shelton  [facility] [address]      To Whom It May Concern:    Henri Shelton is currently under the care of [address] [place] [address]. He will return to work  1/19/23. He was evaluated by our office on 1/20/23 and has had acute mental health episode requiring a YAMILETH from work from 1/3/23 through 1/19/23. He is receiving mental health treatment from myself and will be getting specialist care in the future. Feel free to contact my office for any further information that can be provided if needed.         Sincerely,      Mabel Marx PA-C

## 2023-01-20 NOTE — TELEPHONE ENCOUNTER
Jt Monday 1/20/2023 9:13 AM EST      ----- Message -----  From: Tylor Guillen  Sent: 1/19/2023 5:05 PM EST  To: [facility] Nurses  Subject: Note     Hello I am sorry to bother you but from the 3rd of January to the 19th I was dealing with severe depression and I was out of work on [address] and I need a note so I wont lose my job can you please help me.

## 2023-01-30 ENCOUNTER — PATIENT MESSAGE (OUTPATIENT)
Dept: INTERNAL MEDICINE CLINIC | Age: 33
End: 2023-01-30

## 2023-01-30 ENCOUNTER — VIRTUAL VISIT (OUTPATIENT)
Dept: INTERNAL MEDICINE CLINIC | Age: 33
End: 2023-01-30
Payer: COMMERCIAL

## 2023-01-30 DIAGNOSIS — J06.9 UPPER RESPIRATORY TRACT INFECTION, UNSPECIFIED TYPE: Primary | ICD-10-CM

## 2023-01-30 PROCEDURE — 99213 OFFICE O/P EST LOW 20 MIN: CPT | Performed by: PHYSICIAN ASSISTANT

## 2023-01-30 RX ORDER — AZITHROMYCIN 250 MG/1
TABLET, FILM COATED ORAL
Qty: 6 TABLET | Refills: 0 | Status: SHIPPED | OUTPATIENT
Start: 2023-01-30 | End: 2023-02-04

## 2023-01-30 NOTE — PROGRESS NOTES
Maggie Smith is a 27 y.o. male who was seen by synchronous (real-time) audio-video technology on 1/30/2023 for Sinus Infection (Pain - 0 )        Assessment & Plan:   Diagnoses and all orders for this visit:    1. Upper respiratory tract infection, unspecified type  -     azithromycin (ZITHROMAX) 250 mg tablet; Take 2 tabs on day one, then take one tab days 2-5  Symptoms consistent with URI, viral vs persistent sinusitis. Will cover with Z-pack due to lack of exposure to virus and hx of chronic sinus issues    The complexity of medical decision making for this visit is  low      I spent at least 15 minutes on this visit with this established patient. 712  Subjective:   Patient presents via [employer] virtual visit for 4 days of worsening sore throat with nasal sinus pressure and congestion and post nasal drip. Had subjective fever last night and notes sx's are not improving after day 3-4 but seem to be worsening. Has had success with Z-pack before for similar with his sinus problems in the past. No known exposures to anyone with viral infection. Prior to Admission medications    Medication Sig Start Date End Date Taking? Authorizing Provider   azithromycin (ZITHROMAX) 250 mg tablet Take 2 tabs on day one, then take one tab days 2-5 1/30/23 2/4/23 Yes Jim Rose PA-C   buPROPion SR (WELLBUTRIN, ZYBAN) 200 mg SR tablet Take 1 Tablet by mouth two (2) times a day. 1/20/23   Jim Rose PA-C   metFORMIN (GLUCOPHAGE) 500 mg tablet Take  by mouth daily. Provider, Historical   Omeprazole delayed release (PRILOSEC D/R) 20 mg tablet Take 1 Tablet by mouth daily. 11/29/22   Jim Rose PA-C   topiramate (TOPAMAX) 50 mg tablet Take 1 Tablet by mouth two (2) times a day. 11/29/22   Jim Rose PA-C   montelukast (SINGULAIR) 10 mg tablet Take 1 Tablet by mouth daily. 11/29/22   Jim Rose PA-C   fexofenadine (ALLEGRA) 60 mg tablet Take 1 Tablet by mouth daily.  11/29/22   Jim Rose PA-C ibuprofen (MOTRIN) 800 mg tablet Take 1 Tablet by mouth every eight (8) hours as needed for Pain. 9/13/22   Zabrina William PA-C   multivitamin (ONE A DAY) tablet Take 1 Tablet by mouth daily. Provider, Historical   Lactobacillus acidophilus (PROBIOTIC PO) Take  by mouth. Provider, Historical     Patient Active Problem List   Diagnosis Code    Non-seasonal allergic rhinitis J30.89    Cerebral aneurysm I67.1    Class 2 obesity due to excess calories without serious comorbidity with body mass index (BMI) of 37.0 to 37.9 in adult E66.09, Z68.37    Snoring R06.83    Family history of diabetes mellitus Z83.3    Orgasmic dysfunction [id number]    Mild intermittent asthma without complication C91.06    Hiatal hernia K44.9    Gastroesophageal reflux disease without esophagitis K21.9    Abnormal blood sugar R73.09    Low testosterone R79.89    Seasonal allergic rhinitis due to pollen J30.1    Abscess L02.91    Dysthymia F34.1    Tobacco use Z72.0     Patient Active Problem List    Diagnosis Date Noted    Abscess 09/15/2022    Dysthymia 09/15/2022    Tobacco use 09/15/2022    Abnormal blood sugar 04/29/2022    Low testosterone 04/29/2022    Seasonal allergic rhinitis due to pollen 04/29/2022    Non-seasonal allergic rhinitis 04/14/2022    Cerebral aneurysm 04/14/2022    Class 2 obesity due to excess calories without serious comorbidity with body mass index (BMI) of 37.0 to 37.9 in adult 04/14/2022    Snoring 04/14/2022    Family history of diabetes mellitus 04/14/2022    Orgasmic dysfunction 04/14/2022    Mild intermittent asthma without complication 22/03/7942    Hiatal hernia 04/14/2022    Gastroesophageal reflux disease without esophagitis 04/14/2022     Current Outpatient Medications   Medication Sig Dispense Refill    azithromycin (ZITHROMAX) 250 mg tablet Take 2 tabs on day one, then take one tab days 2-5 6 Tablet 0    buPROPion SR (WELLBUTRIN, ZYBAN) 200 mg SR tablet Take 1 Tablet by mouth two (2) times a day. 30 Tablet 5    metFORMIN (GLUCOPHAGE) 500 mg tablet Take  by mouth daily. Omeprazole delayed release (PRILOSEC D/R) 20 mg tablet Take 1 Tablet by mouth daily. 90 Tablet 3    topiramate (TOPAMAX) 50 mg tablet Take 1 Tablet by mouth two (2) times a day. 60 Tablet 5    montelukast (SINGULAIR) 10 mg tablet Take 1 Tablet by mouth daily. 30 Tablet 5    fexofenadine (ALLEGRA) 60 mg tablet Take 1 Tablet by mouth daily. 90 Tablet 3    ibuprofen (MOTRIN) 800 mg tablet Take 1 Tablet by mouth every eight (8) hours as needed for Pain. 30 Tablet 1    multivitamin (ONE A DAY) tablet Take 1 Tablet by mouth daily. Lactobacillus acidophilus (PROBIOTIC PO) Take  by mouth. No Known Allergies  Past Medical History:   Diagnosis Date    Brain aneurysm 2017     Past Surgical History:   Procedure Laterality Date    HX OTHER SURGICAL  2017    surgery for brain aneurysm     Family History   Problem Relation Age of Onset    Diabetes Mother     Hypertension Mother     Elevated Lipids Mother     No Known Problems Father      Social History     Tobacco Use    Smoking status: Every Day     Packs/day: 1.00     Types: Cigarettes    Smokeless tobacco: Never   Substance Use Topics    Alcohol use: Yes     Comment: occasional        Review of Systems   Constitutional:  Negative for chills and fever. HENT:  Positive for congestion, sinus pain and sore throat. Negative for ear pain and nosebleeds. Respiratory:  Negative for cough and shortness of breath. Cardiovascular:  Negative for chest pain. Gastrointestinal:  Negative for diarrhea, nausea and vomiting. Musculoskeletal:  Negative for myalgias. Skin:  Negative for rash. Neurological:  Negative for headaches.      Objective:     Patient-Reported Vitals 1/30/2023   Patient-Reported Weight 275lb      General: alert, cooperative, no distress   Mental  status: normal mood, behavior, speech, dress, motor activity, and thought processes, able to follow commands   HENT: NCAT Neck: no visualized mass   Resp: no respiratory distress   Neuro: no gross deficits   Skin: no discoloration or lesions of concern on visible areas   Psychiatric: normal affect, consistent with stated mood, no evidence of hallucinations     Additional exam findings: We discussed the expected course, resolution and complications of the diagnosis(es) in detail. Medication risks, benefits, costs, interactions, and alternatives were discussed as indicated. I advised him to contact the office if his condition worsens, changes or fails to improve as anticipated. He expressed understanding with the diagnosis(es) and plan. Nile Murphy, was evaluated through a synchronous (real-time) audio-video encounter. The patient (or guardian if applicable) is aware that this is a billable service, which includes applicable co-pays. This Virtual Visit was conducted with patient's (and/or legal guardian's) consent. The visit was conducted pursuant to the emergency declaration under the [address] [place] [address], [address] [place] [address] authority and the [employer] and [employer] General Act. Patient identification was verified, and a caregiver was present when appropriate. The patient was located at: Home: [address] [address]  The provider was located at:  Facility (Appt Department): [address] [address]        Yolie Onofre PA-C

## 2023-01-30 NOTE — PROGRESS NOTES
Estela Else  Identified pt with two pt identifiers(name and ). Chief Complaint   Patient presents with    Sinus Infection     Pain - 0        Reviewed record In preparation for visit and have obtained necessary documentation. 1. Have you been to the ER, urgent care clinic or hospitalized since your last visit? No     2. Have you seen or consulted any other health care providers outside of the [address] [place] [address] since your last visit? Include any pap smears or colon screening. No    Patient does not have an advance directive. Vitals reviewed with provider. Health Maintenance reviewed:     Health Maintenance Due   Topic    COVID-19 Vaccine (3 - Booster for Pfizer series)          Wt Readings from Last 3 Encounters:   22 275 lb (124.7 kg)   22 289 lb 6.4 oz (131.3 kg)   22 290 lb (131.5 kg)        Temp Readings from Last 3 Encounters:   22 98 °F (36.7 °C)   22 97.5 °F (36.4 °C)   22 97.6 °F (36.4 °C) (Temporal)        BP Readings from Last 3 Encounters:   22 116/73   22 117/76   22 112/80        Pulse Readings from Last 3 Encounters:   22 84   22 79   22 73      There were no vitals filed for this visit.        Learning Assessment:   :       Learning Assessment 2022   PRIMARY LEARNER Patient   HIGHEST LEVEL OF EDUCATION - PRIMARY LEARNER  GRADUATED HIGH SCHOOL OR GED   BARRIERS PRIMARY LEARNER NONE   PRIMARY LANGUAGE ENGLISH   LEARNER PREFERENCE PRIMARY DEMONSTRATION   ANSWERED BY Patient   RELATIONSHIP SELF        Depression Screening:   :       3 most recent PHQ Screens 2023   Little interest or pleasure in doing things Nearly every day   Feeling down, depressed, irritable, or hopeless Nearly every day   Total Score PHQ 2 6   Trouble falling or staying asleep, or sleeping too much Nearly every day   Feeling tired or having little energy Nearly every day   Poor appetite, weight loss, or overeating Nearly every day   Feeling bad about yourself - or that you are a failure or have let yourself or your family down Nearly every day   Trouble concentrating on things such as school, work, reading, or watching TV Nearly every day   Moving or speaking so slowly that other people could have noticed; or the opposite being so fidgety that others notice Not at all   Thoughts of being better off dead, or hurting yourself in some way Not at all   PHQ 9 Score 21        Fall Risk Assessment:   :     No flowsheet data found. Abuse Screening:   :     No flowsheet data found.      ADL Screening:   :       ADL Assessment 4/14/2022   Feeding yourself No Help Needed   Getting from bed to chair No Help Needed   Getting dressed No Help Needed   Bathing or showering No Help Needed   Walk across the room (includes cane/walker) No Help Needed   Using the telphone No Help Needed   Taking your medications No Help Needed   Preparing meals No Help Needed   Managing money (expenses/bills) No Help Needed   Moderately strenuous housework (laundry) No Help Needed   Shopping for personal items (toiletries/medicines) No Help Needed   Shopping for groceries No Help Needed   Driving No Help Needed   Climbing a flight of stairs No Help Needed   Getting to places beyond walking distances No Help Needed

## 2023-01-30 NOTE — LETTER
NOTIFICATION RETURN TO WORK / SCHOOL    1/30/2023 3:04 PM    Mr. Padmini Carrasquillo  [facility] [address]      To Whom It May Concern:    Padmini Carrasquillo is currently under the care of [address] [place] [address]. He will return to work on 2/1/23    If there are questions or concerns please have the patient contact our office.         Sincerely,      Leoncio Carbone PA-C

## 2023-02-03 ENCOUNTER — VIRTUAL VISIT (OUTPATIENT)
Dept: INTERNAL MEDICINE CLINIC | Age: 33
End: 2023-02-03
Payer: COMMERCIAL

## 2023-02-03 DIAGNOSIS — I67.1 CEREBRAL ANEURYSM: Primary | ICD-10-CM

## 2023-02-03 DIAGNOSIS — R06.83 SNORING: ICD-10-CM

## 2023-02-03 DIAGNOSIS — Z83.3 FAMILY HISTORY OF DIABETES MELLITUS: ICD-10-CM

## 2023-02-03 DIAGNOSIS — E66.09 CLASS 1 OBESITY DUE TO EXCESS CALORIES WITHOUT SERIOUS COMORBIDITY IN ADULT, UNSPECIFIED BMI: ICD-10-CM

## 2023-02-03 DIAGNOSIS — J30.89 NON-SEASONAL ALLERGIC RHINITIS, UNSPECIFIED TRIGGER: ICD-10-CM

## 2023-02-03 DIAGNOSIS — F32.1 CURRENT MODERATE EPISODE OF MAJOR DEPRESSIVE DISORDER WITHOUT PRIOR EPISODE (HCC): ICD-10-CM

## 2023-02-03 DIAGNOSIS — K21.9 GASTROESOPHAGEAL REFLUX DISEASE WITHOUT ESOPHAGITIS: ICD-10-CM

## 2023-02-03 PROBLEM — E66.811 CLASS 1 OBESITY DUE TO EXCESS CALORIES WITHOUT SERIOUS COMORBIDITY IN ADULT: Status: ACTIVE | Noted: 2022-04-14

## 2023-02-03 PROBLEM — R79.89 LOW TESTOSTERONE: Status: RESOLVED | Noted: 2022-04-29 | Resolved: 2023-02-03

## 2023-02-03 NOTE — PROGRESS NOTES
Tahmina Sprain  Identified pt with two pt identifiers(name and ). Chief Complaint   Patient presents with    Weight Management     Pain - 0         Reviewed record In preparation for visit and have obtained necessary documentation. 1. Have you been to the ER, urgent care clinic or hospitalized since your last visit? No     2. Have you seen or consulted any other health care providers outside of the [facility] since your last visit? Include any pap smears or colon screening. No    Patient does not have an advance directive. Vitals reviewed with provider. Health Maintenance reviewed:     Health Maintenance Due   Topic    COVID-19 Vaccine (3 - Booster for Pfizer series)          Wt Readings from Last 3 Encounters:   22 275 lb (124.7 kg)   22 289 lb 6.4 oz (131.3 kg)   22 290 lb (131.5 kg)        Temp Readings from Last 3 Encounters:   22 98 °F (36.7 °C)   22 97.5 °F (36.4 °C)   22 97.6 °F (36.4 °C) (Temporal)        BP Readings from Last 3 Encounters:   22 116/73   22 117/76   22 112/80        Pulse Readings from Last 3 Encounters:   22 84   22 79   22 73      There were no vitals filed for this visit.        Learning Assessment:   :       Learning Assessment 2022   PRIMARY LEARNER Patient   HIGHEST LEVEL OF EDUCATION - PRIMARY LEARNER  GRADUATED HIGH SCHOOL OR GED   BARRIERS PRIMARY LEARNER NONE   PRIMARY LANGUAGE ENGLISH   LEARNER PREFERENCE PRIMARY DEMONSTRATION   ANSWERED BY Patient   RELATIONSHIP SELF        Depression Screening:   :       3 most recent PHQ Screens 2023   Little interest or pleasure in doing things Nearly every day   Feeling down, depressed, irritable, or hopeless Nearly every day   Total Score PHQ 2 6   Trouble falling or staying asleep, or sleeping too much Nearly every day   Feeling tired or having little energy Nearly every day   Poor appetite, weight loss, or overeating Nearly every day   Feeling bad about yourself - or that you are a failure or have let yourself or your family down Nearly every day   Trouble concentrating on things such as school, work, reading, or watching TV Nearly every day   Moving or speaking so slowly that other people could have noticed; or the opposite being so fidgety that others notice Not at all   Thoughts of being better off dead, or hurting yourself in some way Not at all   PHQ 9 Score 21        Fall Risk Assessment:   :     No flowsheet data found. Abuse Screening:   :     No flowsheet data found.      ADL Screening:   :       ADL Assessment 4/14/2022   Feeding yourself No Help Needed   Getting from bed to chair No Help Needed   Getting dressed No Help Needed   Bathing or showering No Help Needed   Walk across the room (includes cane/walker) No Help Needed   Using the telphone No Help Needed   Taking your medications No Help Needed   Preparing meals No Help Needed   Managing money (expenses/bills) No Help Needed   Moderately strenuous housework (laundry) No Help Needed   Shopping for personal items (toiletries/medicines) No Help Needed   Shopping for groceries No Help Needed   Driving No Help Needed   Climbing a flight of stairs No Help Needed   Getting to places beyond walking distances No Help Needed

## 2023-02-03 NOTE — PROGRESS NOTES
Tahmina Russell is a 27 y.o. male who was seen by synchronous (real-time) audio-video technology on 2/3/2023 for Weight Management (Pain - 0/)        Assessment & Plan:   Diagnoses and all orders for this visit:    1. Cerebral aneurysm  Had MRA with neurosurgery, follow up in 1 year  2. Gastroesophageal reflux disease without esophagitis  Still needs to schedule GI EGD  3. Non-seasonal allergic rhinitis, unspecified trigger  Stable on current regiment  4. Class 1 obesity due to excess calories without serious comorbidity in adult, unspecified BMI  Continue topamax and metformin, down to 258  5. Family history of diabetes mellitus  [facility]heck with [facility] fall  6. Snoring  Had borderline home sleep test, due for recheck  7. Current moderate episode of major depressive disorder without prior episode ([address]  Stable and improved from January with wellbutrin back on board    Goals to quit smoking and continue weight loss. Nearly done with suboxone therapy for acute opiate withdrawal from late December into January, using outside clinic. Saw Endo and had normal testosterone and pituitary testing    The complexity of medical decision making for this visit is moderate     I spent at least 20 minutes on this visit with this established patient. 712  Subjective:   Patient presents at home for [facility] virtual visit. He is doing well down to 258 lbs with topamax and metformin without s/e. Was 200 in summer 2022. Saw Neurosurg for hx of cerebral aneurysm and has yearly MRA. Normal in fall. Saw endo for low T. Repeat labs and pituitary labs normal, no current issues. Depression: improved with restart of Wellbutrin  mg BID. Trying to quit smoking as well. Snoring: had home test, did not show DAMIEN, trying to get retest as it was borderline but he did not do test long enough. Still feels he likely has DAMIEN. Allergies stable. Feeling better from recent sinus infection, finishing z-pack.      Prior to Admission medications    Medication Sig Start Date End Date Taking? Authorizing Provider   azithromycin (ZITHROMAX) 250 mg tablet Take 2 tabs on day one, then take one tab days 2-5 1/30/23 2/4/23 Yes Rizwana Estevez PA-C   buPROPion SR (WELLBUTRIN, ZYBAN) 200 mg SR tablet Take 1 Tablet by mouth two (2) times a day. 1/20/23  Yes Rizwana Estevez PA-C   metFORMIN (GLUCOPHAGE) 500 mg tablet Take  by mouth daily. Yes Provider, Historical   Omeprazole delayed release (PRILOSEC D/R) 20 mg tablet Take 1 Tablet by mouth daily. 11/29/22  Yes Rizwana Estevez PA-C   topiramate (TOPAMAX) 50 mg tablet Take 1 Tablet by mouth two (2) times a day. 11/29/22  Yes Rizwana Estevez PA-C   montelukast (SINGULAIR) 10 mg tablet Take 1 Tablet by mouth daily. 11/29/22  Yes Rizwana Estevez PA-C   fexofenadine (ALLEGRA) 60 mg tablet Take 1 Tablet by mouth daily. 11/29/22  Yes Rizwana Estevez PA-C   ibuprofen (MOTRIN) 800 mg tablet Take 1 Tablet by mouth every eight (8) hours as needed for Pain. 9/13/22  Yes Rizwana Estevez PA-C   multivitamin (ONE A DAY) tablet Take 1 Tablet by mouth daily. Yes Provider, Historical   Lactobacillus acidophilus (PROBIOTIC PO) Take  by mouth.    Yes Provider, Historical     Patient Active Problem List   Diagnosis Code    Non-seasonal allergic rhinitis J30.89    Cerebral aneurysm I67.1    Class 1 obesity due to excess calories without serious comorbidity in adult E66.09    Snoring R06.83    Family history of diabetes mellitus Z83.3    Orgasmic dysfunction [id number]    Mild intermittent asthma without complication G72.21    Hiatal hernia K44.9    Gastroesophageal reflux disease without esophagitis K21.9    Abnormal blood sugar R73.09    Seasonal allergic rhinitis due to pollen J30.1    Abscess L02.91    Dysthymia F34.1    Tobacco use Z72.0     Patient Active Problem List    Diagnosis Date Noted    Abscess 09/15/2022    Dysthymia 09/15/2022    Tobacco use 09/15/2022    Abnormal blood sugar 04/29/2022 Seasonal allergic rhinitis due to pollen 04/29/2022    Non-seasonal allergic rhinitis 04/14/2022    Cerebral aneurysm 04/14/2022    Class 1 obesity due to excess calories without serious comorbidity in adult 04/14/2022    Snoring 04/14/2022    Family history of diabetes mellitus 04/14/2022    Orgasmic dysfunction 04/14/2022    Mild intermittent asthma without complication 34/68/0324    Hiatal hernia 04/14/2022    Gastroesophageal reflux disease without esophagitis 04/14/2022     Current Outpatient Medications   Medication Sig Dispense Refill    azithromycin (ZITHROMAX) 250 mg tablet Take 2 tabs on day one, then take one tab days 2-5 6 Tablet 0    buPROPion SR (WELLBUTRIN, ZYBAN) 200 mg SR tablet Take 1 Tablet by mouth two (2) times a day. 30 Tablet 5    metFORMIN (GLUCOPHAGE) 500 mg tablet Take  by mouth daily. Omeprazole delayed release (PRILOSEC D/R) 20 mg tablet Take 1 Tablet by mouth daily. 90 Tablet 3    topiramate (TOPAMAX) 50 mg tablet Take 1 Tablet by mouth two (2) times a day. 60 Tablet 5    montelukast (SINGULAIR) 10 mg tablet Take 1 Tablet by mouth daily. 30 Tablet 5    fexofenadine (ALLEGRA) 60 mg tablet Take 1 Tablet by mouth daily. 90 Tablet 3    ibuprofen (MOTRIN) 800 mg tablet Take 1 Tablet by mouth every eight (8) hours as needed for Pain. 30 Tablet 1    multivitamin (ONE A DAY) tablet Take 1 Tablet by mouth daily. Lactobacillus acidophilus (PROBIOTIC PO) Take  by mouth.        No Known Allergies  Past Medical History:   Diagnosis Date    Brain aneurysm 2017     Past Surgical History:   Procedure Laterality Date    HX OTHER SURGICAL  2017    surgery for brain aneurysm     Family History   Problem Relation Age of Onset    Diabetes Mother     Hypertension Mother     Elevated Lipids Mother     No Known Problems Father      Social History     Tobacco Use    Smoking status: Every Day     Packs/day: 1.00     Types: Cigarettes    Smokeless tobacco: Never   Substance Use Topics    Alcohol use: Yes     Comment: occasional        Review of Systems   Constitutional:  Negative for chills, fever, malaise/fatigue and weight loss. Respiratory:  Negative for cough, shortness of breath and wheezing. Cardiovascular:  Negative for chest pain, palpitations and leg swelling. Gastrointestinal:  Negative for abdominal pain, blood in stool, constipation, diarrhea, heartburn, melena, nausea and vomiting. Genitourinary:  Negative for dysuria and frequency. Musculoskeletal:  Negative for myalgias. Skin:  Negative for rash. Neurological:  Negative for dizziness, weakness and headaches. Endo/Heme/Allergies:  Does not bruise/bleed easily. Psychiatric/Behavioral:  Negative for depression. All other systems reviewed and are negative. Objective:     Patient-Reported Vitals 1/30/2023   Patient-Reported Weight 275lb      General: alert, cooperative, no distress   Mental  status: normal mood, behavior, speech, dress, motor activity, and thought processes, able to follow commands   HENT: NCAT   Neck: no visualized mass   Resp: no respiratory distress   Neuro: no gross deficits   Skin: no discoloration or lesions of concern on visible areas   Psychiatric: normal affect, consistent with stated mood, no evidence of hallucinations     Additional exam findings: We discussed the expected course, resolution and complications of the diagnosis(es) in detail. Medication risks, benefits, costs, interactions, and alternatives were discussed as indicated. I advised him to contact the office if his condition worsens, changes or fails to improve as anticipated. He expressed understanding with the diagnosis(es) and plan. Luke Cruz, was evaluated through a synchronous (real-time) audio-video encounter. The patient (or guardian if applicable) is aware that this is a billable service, which includes applicable co-pays. This Virtual Visit was conducted with patient's (and/or legal guardian's) consent.  The visit was conducted pursuant to the emergency declaration under the [facility], [facility] authority and the [employer] General Act. Patient identification was verified, and a caregiver was present when appropriate. The patient was located at: Home: [address] [address]  The provider was located at:  Facility (Appt Department): [address] [facility] [address]        Ary Cabezas PA-C

## 2023-02-23 ENCOUNTER — TELEPHONE (OUTPATIENT)
Dept: INTERNAL MEDICINE CLINIC | Age: 33
End: 2023-02-23

## 2023-02-23 DIAGNOSIS — J45.20 MILD INTERMITTENT ASTHMA WITHOUT COMPLICATION: ICD-10-CM

## 2023-02-23 RX ORDER — ALBUTEROL SULFATE 90 UG/1
1-2 AEROSOL, METERED RESPIRATORY (INHALATION)
Qty: 18 G | Refills: 3 | Status: SHIPPED | OUTPATIENT
Start: 2023-02-23 | End: 2023-05-24

## 2023-02-23 NOTE — TELEPHONE ENCOUNTER
I called the patient and verified them by name and date of birth. Spoke with manager and they suggested we up the days on his accommodations. It should be changed from 2 per month to 3 to 4 days per month. This is needing to be changed due trying to find & see a therapist.     PCP: Birdie Grey PA-C     Last appt: 2/3/2023   No future appointments. Requested Prescriptions     Pending Prescriptions Disp Refills    albuterol (PROVENTIL HFA, VENTOLIN HFA, PROAIR HFA) 90 mcg/actuation inhaler 18 g 3     Sig: Take 1-2 Puffs by inhalation every four (4) hours as needed for Wheezing for up to 90 days.

## 2023-02-23 NOTE — TELEPHONE ENCOUNTER
Pt has some accomodations he needs to discuss as well as talking about his inhaler bc he feels as if the new medication he is on is making his asthma worse.

## 2023-02-27 ENCOUNTER — TELEPHONE (OUTPATIENT)
Dept: INTERNAL MEDICINE CLINIC | Age: 33
End: 2023-02-27

## 2023-02-27 DIAGNOSIS — F40.243 ANXIETY WITH FLYING: Primary | ICD-10-CM

## 2023-02-27 RX ORDER — HYDROXYZINE 25 MG/1
25 TABLET, FILM COATED ORAL
Qty: 6 TABLET | Refills: 0 | Status: SHIPPED | OUTPATIENT
Start: 2023-02-27 | End: 2023-03-01

## 2023-04-18 ENCOUNTER — TELEPHONE (OUTPATIENT)
Dept: INTERNAL MEDICINE CLINIC | Age: 33
End: 2023-04-18

## 2023-04-18 DIAGNOSIS — Z91.89 AT RISK FOR ABUSE OF OPIATES: Primary | ICD-10-CM

## 2023-04-18 RX ORDER — NALOXONE HYDROCHLORIDE 4 MG/.1ML
SPRAY NASAL
Qty: 2 EACH | Refills: 1 | Status: SHIPPED | OUTPATIENT
Start: 2023-04-18

## 2023-04-21 DIAGNOSIS — I72.9 ANEURYSM (HCC): Primary | ICD-10-CM

## 2023-05-01 RX ORDER — FLUTICASONE PROPIONATE 50 MCG
2 SPRAY, SUSPENSION (ML) NASAL DAILY
Qty: 1 EACH | Refills: 3 | Status: SHIPPED | OUTPATIENT
Start: 2023-05-01

## 2023-05-01 NOTE — TELEPHONE ENCOUNTER
PCP: Atul Santillan PA-C     Last appt: 2/3/2023   No future appointments. Requested Prescriptions     Pending Prescriptions Disp Refills    fluticasone propionate (FLONASE) 50 mcg/actuation nasal spray 1 Each 3     Si Sprays by Both Nostrils route daily.

## 2023-05-08 NOTE — TELEPHONE ENCOUNTER
PCP: Carie Siddiqi PA-C     Last appt:  2/3/2023    No future appointments.        Requested Prescriptions     Pending Prescriptions Disp Refills    buPROPion (WELLBUTRIN SR) 200 MG extended release tablet 180 tablet 0     Sig: Take 1 tablet by mouth 2 times daily

## 2023-05-09 RX ORDER — BUPROPION HYDROCHLORIDE 200 MG/1
200 TABLET, EXTENDED RELEASE ORAL 2 TIMES DAILY
Qty: 180 TABLET | Refills: 0 | Status: SHIPPED | OUTPATIENT
Start: 2023-05-09

## 2023-05-21 DIAGNOSIS — J30.89 OTHER ALLERGIC RHINITIS: ICD-10-CM

## 2023-05-21 DIAGNOSIS — J45.20 MILD INTERMITTENT ASTHMA, UNCOMPLICATED: ICD-10-CM

## 2023-05-22 RX ORDER — MONTELUKAST SODIUM 10 MG/1
10 TABLET ORAL NIGHTLY
Qty: 90 TABLET | Refills: 3 | Status: SHIPPED | OUTPATIENT
Start: 2023-05-22

## 2023-05-24 ENCOUNTER — TELEMEDICINE (OUTPATIENT)
Facility: CLINIC | Age: 33
End: 2023-05-24
Payer: COMMERCIAL

## 2023-05-24 DIAGNOSIS — F33.2 SEVERE EPISODE OF RECURRENT MAJOR DEPRESSIVE DISORDER, WITHOUT PSYCHOTIC FEATURES (HCC): Primary | ICD-10-CM

## 2023-05-24 PROCEDURE — 99215 OFFICE O/P EST HI 40 MIN: CPT | Performed by: PHYSICIAN ASSISTANT

## 2023-05-24 RX ORDER — FLUTICASONE PROPIONATE 50 MCG
1 SPRAY, SUSPENSION (ML) NASAL DAILY
Qty: 16 G | Refills: 3 | Status: SHIPPED | OUTPATIENT
Start: 2023-05-24

## 2023-05-24 RX ORDER — FLUTICASONE PROPIONATE 50 MCG
SPRAY, SUSPENSION (ML) NASAL
COMMUNITY
Start: 2018-02-01 | End: 2023-05-24 | Stop reason: SDUPTHER

## 2023-05-24 SDOH — ECONOMIC STABILITY: INCOME INSECURITY: HOW HARD IS IT FOR YOU TO PAY FOR THE VERY BASICS LIKE FOOD, HOUSING, MEDICAL CARE, AND HEATING?: VERY HARD

## 2023-05-24 SDOH — ECONOMIC STABILITY: FOOD INSECURITY: WITHIN THE PAST 12 MONTHS, THE FOOD YOU BOUGHT JUST DIDN'T LAST AND YOU DIDN'T HAVE MONEY TO GET MORE.: SOMETIMES TRUE

## 2023-05-24 SDOH — ECONOMIC STABILITY: FOOD INSECURITY: WITHIN THE PAST 12 MONTHS, YOU WORRIED THAT YOUR FOOD WOULD RUN OUT BEFORE YOU GOT MONEY TO BUY MORE.: SOMETIMES TRUE

## 2023-05-24 SDOH — ECONOMIC STABILITY: HOUSING INSECURITY
IN THE LAST 12 MONTHS, WAS THERE A TIME WHEN YOU DID NOT HAVE A STEADY PLACE TO SLEEP OR SLEPT IN A SHELTER (INCLUDING NOW)?: NO

## 2023-05-24 ASSESSMENT — PATIENT HEALTH QUESTIONNAIRE - PHQ9
SUM OF ALL RESPONSES TO PHQ9 QUESTIONS 1 & 2: 6
SUM OF ALL RESPONSES TO PHQ QUESTIONS 1-9: 22
10. IF YOU CHECKED OFF ANY PROBLEMS, HOW DIFFICULT HAVE THESE PROBLEMS MADE IT FOR YOU TO DO YOUR WORK, TAKE CARE OF THINGS AT HOME, OR GET ALONG WITH OTHER PEOPLE: 3
4. FEELING TIRED OR HAVING LITTLE ENERGY: 3
9. THOUGHTS THAT YOU WOULD BE BETTER OFF DEAD, OR OF HURTING YOURSELF: 0
6. FEELING BAD ABOUT YOURSELF - OR THAT YOU ARE A FAILURE OR HAVE LET YOURSELF OR YOUR FAMILY DOWN: 3
SUM OF ALL RESPONSES TO PHQ QUESTIONS 1-9: 22
7. TROUBLE CONCENTRATING ON THINGS, SUCH AS READING THE NEWSPAPER OR WATCHING TELEVISION: 3
5. POOR APPETITE OR OVEREATING: 1
SUM OF ALL RESPONSES TO PHQ QUESTIONS 1-9: 22
2. FEELING DOWN, DEPRESSED OR HOPELESS: 3
3. TROUBLE FALLING OR STAYING ASLEEP: 3
8. MOVING OR SPEAKING SO SLOWLY THAT OTHER PEOPLE COULD HAVE NOTICED. OR THE OPPOSITE, BEING SO FIGETY OR RESTLESS THAT YOU HAVE BEEN MOVING AROUND A LOT MORE THAN USUAL: 3
1. LITTLE INTEREST OR PLEASURE IN DOING THINGS: 3
SUM OF ALL RESPONSES TO PHQ QUESTIONS 1-9: 22

## 2023-05-24 ASSESSMENT — ENCOUNTER SYMPTOMS
BLOOD IN STOOL: 0
DIARRHEA: 0
SHORTNESS OF BREATH: 0
VOMITING: 0
ABDOMINAL PAIN: 0
CONSTIPATION: 0
NAUSEA: 0

## 2023-05-24 NOTE — PROGRESS NOTES
Lynn Pelaez  Identified pt with two pt identifiers(name and ). Chief Complaint   Patient presents with    Depression     Pain - 8 (hips, body)        Reviewed record In preparation for visit and have obtained necessary documentation. 1. Have you been to the ER, urgent care clinic or hospitalized since your last visit? No     2. Have you seen or consulted any other health care providers outside of the [address] [place] [address] since your last visit? Include any pap smears or colon screening. No    Patient does not have an advance directive. Vitals reviewed with provider. Health Maintenance reviewed:     Health Maintenance Due   Topic    Varicella vaccine (1 of 2 - 2-dose childhood series)    Pneumococcal 0-64 years Vaccine (1 - PCV)    HIV screen     DTaP/Tdap/Td vaccine (1 - Tdap)    COVID-19 Vaccine (3 - Booster for Pfizer series)          Wt Readings from Last 3 Encounters:   22 275 lb (124.7 kg)   22 289 lb 6.4 oz (131.3 kg)   22 291 lb (132 kg)        Temp Readings from Last 3 Encounters:   No data found for Temp        BP Readings from Last 3 Encounters:   22 116/73   22 117/76   22 (!) 161/98        Pulse Readings from Last 3 Encounters:   22 84   22 79   22 85      No flowsheet data found. Learning Assessment:     [facility] LEARNING ASSESSMENT 2023   PRIMARY LEARNER Patient Patient   HIGHEST LEVEL OF EDUCATION - PRIMARY LEARNER - GRADUATED HIGH SCHOOL OR GED   BARRIERS PRIMARY LEARNER - NONE   PRIMARY LANGUAGE ENGLISH ENGLISH   LEARNER PREFERENCE PRIMARY DEMONSTRATION DEMONSTRATION   ANSWERED BY Patient Patient   RELATIONSHIP SELF SELF         Fall Risk Assessment:   :     [facility] Fall Risk Assessment and TUG Test 2022   Total Score 1       Abuse Screening:   :     No flowsheet data found.        ADL Screening:   :     ADL ASSESSMENT 2023   Feeding yourself No Help Needed   Getting from bed to chair No Help Needed

## 2023-05-24 NOTE — PROGRESS NOTES
2023    TELEHEALTH EVALUATION -- Audio/Visual (During  public health emergency)    HPI:    Marga Martinez (:  1990) has requested an audio/video evaluation for the following concern(s):  he is here today to follow up for depression. He reports he has been significantly more stressed out with work. He states [employer] where he works has been adding more and more to his work load and he has not been able to handle it. He notes 3 weeks ago he had a suicide attempt. He did not seek immediate medical attention. He has been mostly compliant with his anti-depressant regiment of 200 mg wellbutrin BID but admits he does have nights where he misses those doses. He has a licensed counselor he talks to weekly. He is requesting a leave from work and to take short term disability to get his mental health improved. He has not seen psychiatry yet but is open to it. He notes he feels better today with a plan getting him better and with the ability to take short term disability to get improvement. He has no current active SI or HI but states if he continues working he \"might explode. \"     He reports additional new BL hip pain. Noting he wakes up and feels his hips are feeling like Maria D Ross are 72years old. \" Unsure if it is his bed. Review of Systems   Constitutional:  Negative for chills and fever. Respiratory:  Negative for shortness of breath. Cardiovascular:  Negative for chest pain, palpitations and leg swelling. Gastrointestinal:  Negative for abdominal pain, blood in stool, constipation, diarrhea, nausea and vomiting. Genitourinary:  Negative for dysuria and hematuria. Musculoskeletal:  Positive for arthralgias. Skin:  Negative for rash. Neurological:  Negative for headaches. Psychiatric/Behavioral:  Positive for dysphoric mood, self-injury and suicidal ideas. Prior to Visit Medications    Medication Sig Taking?  Authorizing Provider   fluticasone (FLONASE) 50 MCG/ACT nasal

## 2023-05-26 ENCOUNTER — PATIENT MESSAGE (OUTPATIENT)
Facility: CLINIC | Age: 33
End: 2023-05-26

## 2023-05-30 ENCOUNTER — OFFICE VISIT (OUTPATIENT)
Facility: CLINIC | Age: 33
End: 2023-05-30
Payer: COMMERCIAL

## 2023-05-30 VITALS
BODY MASS INDEX: 32.37 KG/M2 | TEMPERATURE: 98.2 F | WEIGHT: 252.2 LBS | SYSTOLIC BLOOD PRESSURE: 134 MMHG | HEIGHT: 74 IN | HEART RATE: 67 BPM | OXYGEN SATURATION: 97 % | RESPIRATION RATE: 16 BRPM | DIASTOLIC BLOOD PRESSURE: 84 MMHG

## 2023-05-30 DIAGNOSIS — F33.2 SEVERE EPISODE OF RECURRENT MAJOR DEPRESSIVE DISORDER, WITHOUT PSYCHOTIC FEATURES (HCC): Primary | ICD-10-CM

## 2023-05-30 PROCEDURE — 99214 OFFICE O/P EST MOD 30 MIN: CPT | Performed by: PHYSICIAN ASSISTANT

## 2023-05-30 ASSESSMENT — ENCOUNTER SYMPTOMS
RESPIRATORY NEGATIVE: 1
CONSTIPATION: 1
EYES NEGATIVE: 1

## 2023-05-30 NOTE — TELEPHONE ENCOUNTER
I called and verified the patient by name and date of birth.  He is scheduled to come in today at 3pm.

## 2023-05-30 NOTE — PROGRESS NOTES
Toshia Maxwell  Identified pt with two pt identifiers(name and ). Chief Complaint   Patient presents with    Forms     Room 4A //        Reviewed record In preparation for visit and have obtained necessary documentation. 1. Have you been to the ER, urgent care clinic or hospitalized since your last visit? Yes - [facility]    2. Have you seen or consulted any other health care providers outside of the [address] [place] [address] since your last visit? Include any pap smears or colon screening. No.     Patient does not have an advance directive. Vitals reviewed with provider. Health Maintenance reviewed:     Health Maintenance Due   Topic    Varicella vaccine (1 of 2 - 2-dose childhood series)    Pneumococcal 0-64 years Vaccine (1 - PCV)    HIV screen     DTaP/Tdap/Td vaccine (1 - Tdap)    COVID-19 Vaccine (3 - Booster for Pfizer series)          Wt Readings from Last 3 Encounters:   23 252 lb 3.2 oz (114.4 kg)   22 275 lb (124.7 kg)   22 289 lb 6.4 oz (131.3 kg)        Temp Readings from Last 3 Encounters:   23 98.2 °F (36.8 °C) (Oral)        BP Readings from Last 3 Encounters:   23 134/84   22 116/73   22 117/76        Pulse Readings from Last 3 Encounters:   23 67   22 84   22 79      No flowsheet data found. Learning Assessment:     [facility] LEARNING ASSESSMENT 2023   PRIMARY LEARNER Patient Patient   HIGHEST LEVEL OF EDUCATION - PRIMARY LEARNER - GRADUATED HIGH SCHOOL OR GED   BARRIERS PRIMARY LEARNER - NONE   PRIMARY LANGUAGE ENGLISH ENGLISH   LEARNER PREFERENCE PRIMARY DEMONSTRATION DEMONSTRATION   ANSWERED BY Patient Patient   RELATIONSHIP SELF SELF         Fall Risk Assessment:   :     [facility] Fall Risk Assessment and TUG Test 2022   Total Score 1       Abuse Screening:   :     No flowsheet data found.        ADL Screening:   :     ADL ASSESSMENT 2023   Feeding yourself No Help Needed   Getting from bed to chair No

## 2023-05-30 NOTE — TELEPHONE ENCOUNTER
From: Annetteil Bone  Sent: 5/29/2023 10:24 PM EDT  To: [id number] [facility] Clinical Staff  Subject: Joleen Boast     I am sure they will want it so can we set up a virtual sharonda to do that visit and I can come sign anytime.

## 2023-05-30 NOTE — PROGRESS NOTES
Hasmukh Osorio is a 26y.o. year old male seen in clinic today for   Chief Complaint   Patient presents with    Forms     Room 4A //        he is here today to follow up for paperwork for short term disability. Needed further psycological eval on paperwork for [employer] so needed him to come in for completion. No current SI/HI. Continuing to have social issues, concentration issues and stressors related to work and work load with [address]. No acute issues. Went to [facility] ED yesterday for acute constipation x 1 week. Advised to use miralax as needed and increase fluid and stool softener use. Had tried home enema and disimpaction but was unsuccessful without acute pain. Resolved in ED with second enema. he specifically denies any CP, SOB, HA. Dizziness, fevers, chills, N/V/D, urinary symptoms or other bowel changes. Current Outpatient Medications on File Prior to Visit   Medication Sig Dispense Refill    fluticasone (FLONASE) 50 MCG/ACT nasal spray 1 spray by Nasal route daily 16 g 3    montelukast (SINGULAIR) 10 MG tablet Take 1 tablet by mouth nightly Please call the office [phone] to schedule an appointment. 90 tablet 3    buPROPion (WELLBUTRIN SR) 200 MG extended release tablet Take 1 tablet by mouth 2 times daily 180 tablet 0    fexofenadine (ALLEGRA) 60 MG tablet Take 1 tablet by mouth daily      ibuprofen (ADVIL;MOTRIN) 800 MG tablet Take 1 tablet by mouth every 8 hours as needed      metFORMIN (GLUCOPHAGE) 500 MG tablet Take by mouth daily      omeprazole (PRILOSEC OTC) 20 MG tablet Take 1 tablet by mouth daily      topiramate (TOPAMAX) 50 MG tablet Take 1 tablet by mouth 2 times daily      albuterol sulfate HFA (PROVENTIL;VENTOLIN;PROAIR) 108 (90 Base) MCG/ACT inhaler Inhale 1-2 puffs into the lungs every 4 hours as needed       No current facility-administered medications on file prior to visit.          No Known Allergies  Past Medical History:   Diagnosis Date    Brain aneurysm 2017

## 2023-06-05 ENCOUNTER — TELEPHONE (OUTPATIENT)
Facility: CLINIC | Age: 33
End: 2023-06-05

## 2023-06-05 NOTE — TELEPHONE ENCOUNTER
Patient had timoteo stress done 04/21/2019 at [facility]. He would like to know if he still needs to have echo.   The patient called and verified their name and date of birth. He spoke with disability and they stated that his forms were not approved due to the forms saying that everything is normal. After checking with Colby Ng I informed the patient that based Colby Ng had to answer based on the questions asked. The only other option is to check with his psychiatrist to see if they can fill them out. The patient stated he has not been seeing a psychiatrist but a counselor. He wanted to know if we are going to set him up with one. I informed him that I will check with Cam on Monday to see how to proceed. After speaking with Cam this morning. We set up a referral for [facility]. I called the patient and verified them by name and date of birth. I informed him that the referral has been sent and they should be reaching out soon. If this does not work out, let me know so I can see if there are other options. I also received a request of information in regards to the disability forms. That information will be sent today as well. He stated understanding and had no further questions.

## 2023-06-08 ENCOUNTER — TELEPHONE (OUTPATIENT)
Facility: CLINIC | Age: 33
End: 2023-06-08

## 2023-06-08 NOTE — TELEPHONE ENCOUNTER
HSAT demonstrated normal AHI of 4.6/h associated with minimal SPO2 of 81%. Snoring during 13.8%. HSAT potentially underestimated severity of sleep disordered breathing. Recommendation: Attended PSG    Sleep technologist: Please review HSAT results with the patient. Order has been entered for attended study. Lab: [id number] Lab: [id number]

## 2023-06-09 NOTE — TELEPHONE ENCOUNTER
Spoke with Monica Moralez from [facility] and answered additional questions

## 2023-07-06 ENCOUNTER — TELEPHONE (OUTPATIENT)
Facility: CLINIC | Age: 33
End: 2023-07-06

## 2023-07-10 PROBLEM — F33.2 SEVERE EPISODE OF RECURRENT MAJOR DEPRESSIVE DISORDER, WITHOUT PSYCHOTIC FEATURES (HCC): Status: ACTIVE | Noted: 2023-07-10

## 2023-07-11 ENCOUNTER — TELEMEDICINE (OUTPATIENT)
Facility: CLINIC | Age: 33
End: 2023-07-11
Payer: COMMERCIAL

## 2023-07-11 DIAGNOSIS — F33.2 SEVERE EPISODE OF RECURRENT MAJOR DEPRESSIVE DISORDER, WITHOUT PSYCHOTIC FEATURES (HCC): Primary | ICD-10-CM

## 2023-07-11 DIAGNOSIS — N50.89 MASS OF RIGHT TESTICLE: ICD-10-CM

## 2023-07-11 DIAGNOSIS — Z72.0 TOBACCO USE: ICD-10-CM

## 2023-07-11 PROCEDURE — 99214 OFFICE O/P EST MOD 30 MIN: CPT | Performed by: PHYSICIAN ASSISTANT

## 2023-07-11 RX ORDER — CITALOPRAM 10 MG/1
10 TABLET ORAL DAILY
COMMUNITY

## 2023-07-11 RX ORDER — IBUPROFEN 800 MG/1
800 TABLET ORAL EVERY 8 HOURS PRN
Qty: 120 TABLET | Refills: 0 | Status: SHIPPED | OUTPATIENT
Start: 2023-07-11

## 2023-07-11 RX ORDER — BUPROPION HYDROCHLORIDE 200 MG/1
200 TABLET, EXTENDED RELEASE ORAL 2 TIMES DAILY
Qty: 180 TABLET | Refills: 0 | Status: SHIPPED | OUTPATIENT
Start: 2023-07-11

## 2023-07-11 ASSESSMENT — ENCOUNTER SYMPTOMS
ABDOMINAL PAIN: 0
DIARRHEA: 0
VOMITING: 0
SHORTNESS OF BREATH: 0
CONSTIPATION: 0
BLOOD IN STOOL: 0
NAUSEA: 0

## 2023-07-11 NOTE — PROGRESS NOTES
2023    TELEHEALTH EVALUATION -- Audio/Visual    HPI:    Maren Khan (:  1990) has requested an audio/video evaluation for the following concern(s):    Major depressive disorder. Patient has been following now with [facility]. He has seen psychiatry and therapist semi regularly and he was started on low dose celexa 10? Unsure of dose, had not picked up yet or started, has concerns about libido and erections. Notes he wishes he had a male therapist and has been struggling getting his documentation from [facility] for his short term disability claims and has made multiple calls. He notes he has still had intermittent SI but \"has that under control\" after having better resources in his family and friends to talk to. He notes he does still feel highly anxious at times and has intermittent depressed mood. He needs refill of wellbutrin . He notes additionally he would like to evaluate a spot on his right testicle he believes, unsure if lump or mass or normal, non-painful but does not believe it has been there before. Review of Systems   Constitutional:  Negative for chills and fever. Respiratory:  Negative for shortness of breath. Cardiovascular:  Negative for chest pain, palpitations and leg swelling. Gastrointestinal:  Negative for abdominal pain, blood in stool, constipation, diarrhea, nausea and vomiting. Genitourinary:  Negative for dysuria and hematuria. Skin:  Negative for rash. Neurological:  Negative for headaches. Psychiatric/Behavioral:  Positive for dysphoric mood and suicidal ideas. The patient is nervous/anxious. Prior to Visit Medications    Medication Sig Taking? Authorizing Provider   fluticasone (FLONASE) 50 MCG/ACT nasal spray 1 spray by Nasal route daily Yes Shara Khalil PA-C   montelukast (SINGULAIR) 10 MG tablet Take 1 tablet by mouth nightly Please call the office [phone] to schedule an appointment.  Yes Shara Khalil PA-C

## 2023-08-14 RX ORDER — FLUTICASONE PROPIONATE 50 MCG
SPRAY, SUSPENSION (ML) NASAL
Qty: 16 G | Refills: 1 | Status: SHIPPED | OUTPATIENT
Start: 2023-08-14

## 2023-08-14 RX ORDER — IBUPROFEN 800 MG/1
800 TABLET ORAL EVERY 8 HOURS PRN
Qty: 90 TABLET | Refills: 1 | Status: SHIPPED | OUTPATIENT
Start: 2023-08-14

## 2023-08-14 NOTE — TELEPHONE ENCOUNTER
PCP: Tim Montero PA-C     Last appt:  7/11/2023      Future Appointments   Date Time Provider [facility] [address]   8/29/2023  3:30 PM Tim Montero PA-C [facility] BS AMB          Requested Prescriptions     Pending Prescriptions Disp Refills    fluticasone (FLONASE) 50 MCG/ACT nasal spray [Pharmacy Med Name: FLUTICASONE PROP 50 MCG SPRAY] 16 g 1     Sig: SPRAY 1 SPRAY BY NASAL ROUTE EVERY DAY

## 2023-08-14 NOTE — TELEPHONE ENCOUNTER
PCP: Kellen Collier PA-C     Last appt:  7/11/2023      Future Appointments   Date Time Provider [address]   8/29/2023  3:30 PM Kellen Collier PA-C [facility] AMB          Requested Prescriptions     Pending Prescriptions Disp Refills    ibuprofen (ADVIL;MOTRIN) 800 MG tablet [Pharmacy Med Name: IBUPROFEN 800 MG TABLET] 90 tablet 1     Sig: TAKE 1 TABLET BY MOUTH EVERY 8 HOURS AS NEEDED FOR PAIN

## 2023-08-15 ENCOUNTER — PATIENT MESSAGE (OUTPATIENT)
Facility: CLINIC | Age: 33
End: 2023-08-15

## 2023-08-15 NOTE — TELEPHONE ENCOUNTER
I called the patient and verified them by name and date of birth. He does not like [facility]. Has been seeing the counselor and psychiatrist but is having issues. They were supposed to be sending their notes to him so he can submit them to [facility].  He is now in search of a new counselor and psychiatrist.

## 2023-08-15 NOTE — TELEPHONE ENCOUNTER
From: Maren Khan  To: Shara Khalil  Sent: 8/15/2023 12:33 PM EDT  Subject: New counselor/psychiatrist     Ken call me asap to get a new psychiatrist and counselor this is not working with the first one.

## 2023-08-16 NOTE — TELEPHONE ENCOUNTER
Can call all resources given and can call his insurance provider for counselors and psychiatrists available to him in his insurance.  Would recommend he continue [facility] until he finds a new provider

## 2023-08-28 PROBLEM — F34.1 DYSTHYMIA: Status: RESOLVED | Noted: 2022-09-15 | Resolved: 2023-08-28

## 2023-08-29 ENCOUNTER — TELEMEDICINE (OUTPATIENT)
Facility: CLINIC | Age: 33
End: 2023-08-29
Payer: COMMERCIAL

## 2023-08-29 DIAGNOSIS — E66.09 CLASS 1 OBESITY DUE TO EXCESS CALORIES WITHOUT SERIOUS COMORBIDITY WITH BODY MASS INDEX (BMI) OF 32.0 TO 32.9 IN ADULT: ICD-10-CM

## 2023-08-29 DIAGNOSIS — F41.9 ANXIETY: ICD-10-CM

## 2023-08-29 DIAGNOSIS — J45.20 MILD INTERMITTENT ASTHMA WITHOUT COMPLICATION: Primary | ICD-10-CM

## 2023-08-29 DIAGNOSIS — F33.2 SEVERE EPISODE OF RECURRENT MAJOR DEPRESSIVE DISORDER, WITHOUT PSYCHOTIC FEATURES (HCC): ICD-10-CM

## 2023-08-29 PROCEDURE — 99214 OFFICE O/P EST MOD 30 MIN: CPT | Performed by: PHYSICIAN ASSISTANT

## 2023-08-29 ASSESSMENT — ENCOUNTER SYMPTOMS
ABDOMINAL PAIN: 0
DIARRHEA: 0
BLOOD IN STOOL: 0
CONSTIPATION: 0
SHORTNESS OF BREATH: 0
VOMITING: 0
NAUSEA: 0

## 2023-08-29 ASSESSMENT — PAIN SCALES - GENERAL: PAINLEVEL_OUTOF10: 0

## 2023-08-29 NOTE — PROGRESS NOTES
2023    TELEHEALTH EVALUATION -- Audio/Visual    HPI:    Gricelda Garcia (:  1990) has requested an audio/video evaluation for the following concern(s): Anxiety/depression. He has been out on short term leave due major depressive disorder severe with history of suicidal ideations and plan as well as severe anxiety. He had been working with therapist and mental health specialist. He has been stable on wellbutrin 200 mg XL BID. He did not do well on celexa or buspar. He was recently tried on propranolol for anxiety but decided he no longer wanted to work with psychiatrist and is now looking for new psych. He is continuing to work with therapist Aileen Galeana. She recommended he continue staying out from work until Oct 1. He continues to have severe anxiety. He has been working with therapist but finds he is not able to actually take the time do talk about constructive things because they are constantly trying to work through stuff with his short term disability paperwork. Review of Systems   Constitutional:  Negative for chills and fever. Respiratory:  Negative for shortness of breath. Cardiovascular:  Negative for chest pain, palpitations and leg swelling. Gastrointestinal:  Negative for abdominal pain, blood in stool, constipation, diarrhea, nausea and vomiting. Genitourinary:  Negative for dysuria and hematuria. Skin:  Negative for rash. Neurological:  Negative for headaches. Psychiatric/Behavioral:  Positive for dysphoric mood. The patient is nervous/anxious. Prior to Visit Medications    Medication Sig Taking?  Authorizing Provider   ibuprofen (ADVIL;MOTRIN) 800 MG tablet TAKE 1 TABLET BY MOUTH EVERY 8 HOURS AS NEEDED FOR PAIN Yes Joseph Linares PA-C   fluticasone (FLONASE) 50 MCG/ACT nasal spray SPRAY 1 SPRAY BY NASAL ROUTE EVERY DAY Yes Joseph Linares PA-C   buPROPion [facility] SR) 200 MG extended release tablet Take 1 tablet by mouth 2 times daily Yes Xavier Bhagat

## 2023-10-12 RX ORDER — FLUTICASONE PROPIONATE 50 MCG
SPRAY, SUSPENSION (ML) NASAL
Qty: 48 G | Refills: 1 | Status: SHIPPED | OUTPATIENT
Start: 2023-10-12

## 2023-10-12 NOTE — TELEPHONE ENCOUNTER
PCP: Merline Heath PA-C     Last appt:  8/29/2023      Future Appointments   Date Time Provider [facility] [address]   10/17/2023  1:30 PM Merline Heath PA-C [facility] AMB          Requested Prescriptions     Pending Prescriptions Disp Refills    fluticasone (FLONASE) 50 MCG/ACT nasal spray 48 g 1     Sig: SPRAY 1 SPRAY BY NASAL ROUTE EVERY DAY

## 2023-10-17 ENCOUNTER — TELEMEDICINE (OUTPATIENT)
Facility: CLINIC | Age: 33
End: 2023-10-17
Payer: COMMERCIAL

## 2023-10-17 ENCOUNTER — TELEPHONE (OUTPATIENT)
Facility: CLINIC | Age: 33
End: 2023-10-17

## 2023-10-17 DIAGNOSIS — J45.20 MILD INTERMITTENT ASTHMA WITHOUT COMPLICATION: ICD-10-CM

## 2023-10-17 DIAGNOSIS — M25.511 CHRONIC RIGHT SHOULDER PAIN: Primary | ICD-10-CM

## 2023-10-17 DIAGNOSIS — G89.29 CHRONIC RIGHT SHOULDER PAIN: Primary | ICD-10-CM

## 2023-10-17 DIAGNOSIS — F41.9 ANXIETY: ICD-10-CM

## 2023-10-17 DIAGNOSIS — F33.2 SEVERE EPISODE OF RECURRENT MAJOR DEPRESSIVE DISORDER, WITHOUT PSYCHOTIC FEATURES (HCC): ICD-10-CM

## 2023-10-17 PROCEDURE — 99214 OFFICE O/P EST MOD 30 MIN: CPT | Performed by: PHYSICIAN ASSISTANT

## 2023-10-17 ASSESSMENT — PAIN SCALES - GENERAL: PAINLEVEL_OUTOF10: 5

## 2023-10-17 ASSESSMENT — ENCOUNTER SYMPTOMS
SHORTNESS OF BREATH: 0
EYES NEGATIVE: 1
RESPIRATORY NEGATIVE: 1
GASTROINTESTINAL NEGATIVE: 1

## 2023-10-17 ASSESSMENT — PAIN DESCRIPTION - LOCATION: LOCATION: SHOULDER

## 2023-10-17 NOTE — TELEPHONE ENCOUNTER
Pt stated that he needs to do the virtual at least this time due to his accomodation and time off work is messed up and needs to get that straight so that he can come into his appt. Pt asked for someone to please call so he can explain this too. He knows he needs to come in and wants to but his work is not allowing it.

## 2023-10-17 NOTE — PROGRESS NOTES
10/17/2023    TELEHEALTH EVALUATION -- Audio/Visual    HPI:    Brodie Nolasco (:  1990) has requested an audio/video evaluation for the following concern(s):    He is requesting new accommodations forms. Also notes his R shoulder is acting up again with weather change. Requesting refill of tramadol initially given by ortho? Had cortisone shot then. Notes he is out of tramadol. Needs accomodation for twice weekly appt slots with work. Hx of depression, severe, working with therapist once a week and psychiatrist. Had option for twice weekly 4 hour slot for appts. Needs attempt to get work from home accommodations with hx of asthma and higher risk of decompensation with viral illness. Needs depression related work hour accommodations to work 8-5 M-F    Review of Systems   Constitutional: Negative. Negative for fever. HENT: Negative. Eyes: Negative. Respiratory: Negative. Negative for shortness of breath. Cardiovascular: Negative. Negative for chest pain. Gastrointestinal: Negative. Genitourinary:  Negative for dysuria and hematuria. Musculoskeletal:  Positive for joint swelling and myalgias. Skin:  Negative for rash and wound. Neurological:  Negative for weakness and numbness. All other systems reviewed and are negative. Prior to Visit Medications    Medication Sig Taking?  Authorizing Provider   fluticasone (FLONASE) 50 MCG/ACT nasal spray SPRAY 1 SPRAY BY NASAL ROUTE EVERY DAY Yes Amie Herrera PA-C   metFORMIN (GLUCOPHAGE) 500 MG tablet TAKE 1 TABLET BY MOUTH EVERY DAY Yes Amie Herrera PA-C   ibuprofen (ADVIL;MOTRIN) 800 MG tablet TAKE 1 TABLET BY MOUTH EVERY 8 HOURS AS NEEDED FOR PAIN Yes Amie Herrera PA-C   buPROPion [facility] SR) 200 MG extended release tablet Take 1 tablet by mouth 2 times daily Yes Amie Herrera PA-C   montelukast (SINGULAIR) 10 MG tablet Take 1 tablet by mouth nightly Please call the office [phone] to schedule

## 2023-10-17 NOTE — TELEPHONE ENCOUNTER
I called the patient and verified them by name and date of birth. He stated that his employer told him that he could not take off today due to his accomodation form being overrided with the forms for leave. He really wanted to come in today but they would not let him off. He would like to change the appointment to virtual due to needing a new accomodation form so he can be off for future appointments. I changed the appointment to a virtual but informed him that he will need to come in person to the next visit. He stated understanding and had no further questions.

## 2023-11-12 RX ORDER — IBUPROFEN 800 MG/1
800 TABLET ORAL EVERY 8 HOURS PRN
Qty: 90 TABLET | Refills: 1 | Status: SHIPPED | OUTPATIENT
Start: 2023-11-12

## 2023-12-04 ENCOUNTER — TELEPHONE (OUTPATIENT)
Facility: CLINIC | Age: 33
End: 2023-12-04

## 2023-12-04 NOTE — TELEPHONE ENCOUNTER
Work from home accomodation is for asthma. High risk for decompensation with viral etiologies in office setting space so it is safer in general for patient to work from home if it is possible with his position. End of work from home date is yearly re-evaluation date of 10/24 so next year in year 2024.  Intermediate leave date is also at 1 year as he is likely to continue to have 3-4 appointments per month for the next year

## 2023-12-04 NOTE — TELEPHONE ENCOUNTER
I called Jacquelyn Peres and she just needs clarification on the case. She is the medical accommodation specialist with [facility]. She wanted to confirm if the end date for working from home & intermediate leave has the same end date, also if the 8 to 5pm shift is valid until 10.24.2024. Is the request to work from home due to asthma, if so what can be done to support the patient to come back in the home. Need clarification as to what is the reason to work from home.

## 2023-12-04 NOTE — TELEPHONE ENCOUNTER
Rosie Valadez from [facility] called for clarification on medical documentation from 10/4 stating pt needa intermittent time off, four times a month off.  Can call back at [phone]

## 2023-12-04 NOTE — TELEPHONE ENCOUNTER
I called Missy Reyes and informed her on the message per Cam. She stated understanding and had no further questions. No other accommodations.

## 2024-01-11 RX ORDER — IBUPROFEN 800 MG/1
800 TABLET ORAL EVERY 8 HOURS PRN
Qty: 90 TABLET | Refills: 1 | Status: SHIPPED | OUTPATIENT
Start: 2024-01-11

## 2024-01-11 NOTE — TELEPHONE ENCOUNTER
PCP: Frederick Mendez PA-C     Last appt:  10/17/2023    No future appointments.       Requested Prescriptions     Pending Prescriptions Disp Refills    ibuprofen (ADVIL;MOTRIN) 800 MG tablet [Pharmacy Med Name: IBUPROFEN 800 MG TABLET] 90 tablet 1     Sig: TAKE 1 TABLET BY MOUTH EVERY 8 HOURS AS NEEDED FOR PAIN

## 2024-02-20 DIAGNOSIS — K21.9 GASTRO-ESOPHAGEAL REFLUX DISEASE WITHOUT ESOPHAGITIS: ICD-10-CM

## 2024-02-20 DIAGNOSIS — K44.9 DIAPHRAGMATIC HERNIA WITHOUT OBSTRUCTION OR GANGRENE: ICD-10-CM

## 2024-02-20 RX ORDER — OMEPRAZOLE 20 MG/1
CAPSULE, DELAYED RELEASE ORAL DAILY
Qty: 90 CAPSULE | Refills: 1 | Status: SHIPPED | OUTPATIENT
Start: 2024-02-20

## 2024-02-20 NOTE — TELEPHONE ENCOUNTER
PCP: Frederick Mendez PA-C     Last appt:  10/17/2023    No future appointments.       Requested Prescriptions     Pending Prescriptions Disp Refills    omeprazole (PRILOSEC) 20 MG delayed release capsule [Pharmacy Med Name: OMEPRAZOLE DR 20 MG CAPSULE] 90 capsule 3     Sig: TAKE 1 CAPSULE BY MOUTH EVERY DAY

## 2024-03-17 DIAGNOSIS — J45.20 MILD INTERMITTENT ASTHMA, UNCOMPLICATED: ICD-10-CM

## 2024-03-18 RX ORDER — ALBUTEROL SULFATE 90 UG/1
AEROSOL, METERED RESPIRATORY (INHALATION)
Qty: 6.7 EACH | Refills: 3 | Status: SHIPPED | OUTPATIENT
Start: 2024-03-18

## 2024-03-18 RX ORDER — BUPROPION HYDROCHLORIDE 200 MG/1
200 TABLET, EXTENDED RELEASE ORAL 2 TIMES DAILY
Qty: 180 TABLET | Refills: 0 | Status: SHIPPED | OUTPATIENT
Start: 2024-03-18

## 2024-03-18 NOTE — TELEPHONE ENCOUNTER
PCP: Frederick Mendez PA-C     Last appt:  10/17/2023      Future Appointments   Date Time Provider Department Center   5/13/2024 11:00 AM Frederick Mendez PA-C [facility] BS AMB          Requested Prescriptions     Pending Prescriptions Disp Refills    albuterol sulfate HFA (PROVENTIL;VENTOLIN;PROAIR) 108 (90 Base) MCG/ACT inhaler [Pharmacy Med Name: ALBUTEROL HFA (PROVENTIL) INH] 6.7 each 3     Sig: TAKE 1-2 PUFFS BY INHALATION EVERY FOUR (4) HOURS AS NEEDED FOR WHEEZING FOR UP TO 90 DAYS.    buPROPion (WELLBUTRIN SR) 200 MG extended release tablet [Pharmacy Med Name: BUPROPION HCL  MG TABLET] 180 tablet 0     Sig: TAKE 1 TABLET BY MOUTH TWICE A DAY

## 2024-12-09 ENCOUNTER — TELEPHONE (OUTPATIENT)
Facility: CLINIC | Age: 34
End: 2024-12-09

## 2024-12-09 NOTE — TELEPHONE ENCOUNTER
Called 12/8/24 with \"strep throat\" - asking for z pack. Self diagnosed - had sore throat, postnasal drip. Advised urgent care for evaluation.

## 2025-05-28 ENCOUNTER — HOSPITAL ENCOUNTER (EMERGENCY)
Facility: HOSPITAL | Age: 35
Discharge: LEFT BEFORE DISCHARGE | End: 2025-05-28

## 2025-05-28 VITALS
DIASTOLIC BLOOD PRESSURE: 92 MMHG | RESPIRATION RATE: 16 BRPM | HEART RATE: 73 BPM | TEMPERATURE: 97.9 F | WEIGHT: 299.38 LBS | SYSTOLIC BLOOD PRESSURE: 142 MMHG | OXYGEN SATURATION: 100 % | HEIGHT: 72 IN | BODY MASS INDEX: 40.55 KG/M2

## 2025-05-28 ASSESSMENT — PAIN - FUNCTIONAL ASSESSMENT
PAIN_FUNCTIONAL_ASSESSMENT: ACTIVITIES ARE NOT PREVENTED
PAIN_FUNCTIONAL_ASSESSMENT: 0-10

## 2025-05-28 ASSESSMENT — PAIN DESCRIPTION - ONSET: ONSET: ON-GOING

## 2025-05-28 ASSESSMENT — PAIN DESCRIPTION - LOCATION: LOCATION: GENERALIZED

## 2025-05-28 ASSESSMENT — PAIN SCALES - GENERAL: PAINLEVEL_OUTOF10: 6

## 2025-05-28 ASSESSMENT — PAIN DESCRIPTION - PAIN TYPE: TYPE: ACUTE PAIN

## 2025-05-28 ASSESSMENT — PAIN DESCRIPTION - FREQUENCY: FREQUENCY: CONTINUOUS

## 2025-05-28 ASSESSMENT — PAIN DESCRIPTION - DESCRIPTORS: DESCRIPTORS: ACHING

## 2025-05-29 NOTE — ED TRIAGE NOTES
Patient in through triage with reports of missed appointment today at [facility] methadone [facility] ([phone]) due to another appt taking too long and resulting in him missing the methadone appt. Patient has been compliant with the clinic since January and denies using heroin since. Reports generalized body aches.

## 2025-05-31 ENCOUNTER — HOSPITAL ENCOUNTER (EMERGENCY)
Facility: HOSPITAL | Age: 35
Discharge: HOME | End: 2025-05-31
Attending: EMERGENCY MEDICINE
Payer: COMMERCIAL

## 2025-05-31 VITALS
TEMPERATURE: 98.2 F | SYSTOLIC BLOOD PRESSURE: 130 MMHG | DIASTOLIC BLOOD PRESSURE: 77 MMHG | BODY MASS INDEX: 40.31 KG/M2 | HEIGHT: 72 IN | RESPIRATION RATE: 18 BRPM | WEIGHT: 297.62 LBS | HEART RATE: 71 BPM | OXYGEN SATURATION: 96 %

## 2025-05-31 DIAGNOSIS — Z76.0 ENCOUNTER FOR MEDICATION REFILL: Primary | ICD-10-CM

## 2025-05-31 PROCEDURE — 6370000000 HC RX 637 (ALT 250 FOR IP)

## 2025-05-31 PROCEDURE — 99283 EMERGENCY DEPT VISIT LOW MDM: CPT

## 2025-05-31 RX ORDER — METHADONE HYDROCHLORIDE 10 MG/ML
130 CONCENTRATE ORAL ONCE
Refills: 0 | Status: COMPLETED | OUTPATIENT
Start: 2025-05-31 | End: 2025-05-31

## 2025-05-31 RX ADMIN — METHADONE HYDROCHLORIDE 130 MG: 10 CONCENTRATE ORAL at 17:43

## 2025-05-31 ASSESSMENT — PAIN DESCRIPTION - DESCRIPTORS: DESCRIPTORS: ACHING

## 2025-05-31 ASSESSMENT — PAIN SCALES - GENERAL
PAINLEVEL_OUTOF10: 0
PAINLEVEL_OUTOF10: 8

## 2025-05-31 ASSESSMENT — PAIN DESCRIPTION - FREQUENCY: FREQUENCY: CONTINUOUS

## 2025-05-31 ASSESSMENT — PAIN DESCRIPTION - LOCATION: LOCATION: GENERALIZED

## 2025-05-31 ASSESSMENT — PAIN DESCRIPTION - ONSET: ONSET: PROGRESSIVE

## 2025-05-31 ASSESSMENT — PAIN DESCRIPTION - PAIN TYPE: TYPE: ACUTE PAIN

## 2025-05-31 ASSESSMENT — PAIN - FUNCTIONAL ASSESSMENT
PAIN_FUNCTIONAL_ASSESSMENT: NONE - DENIES PAIN
PAIN_FUNCTIONAL_ASSESSMENT: PREVENTS OR INTERFERES SOME ACTIVE ACTIVITIES AND ADLS

## 2025-05-31 NOTE — ED PROVIDER NOTES
[facility] EMERGENCY DEPARTMENT  EMERGENCY DEPARTMENT ENCOUNTER      Pt Name: Eloy Sanderson  MRN: [id number]  Birthdate 1990  Date of evaluation: 5/31/2025  Provider: Nisreen Tejeda PA-C    CHIEF COMPLAINT       Chief Complaint   Patient presents with    Medication Refill     Methadone         HISTORY OF PRESENT ILLNESS   (Location/Symptom, Timing/Onset, Context/Setting, Quality, Duration, Modifying Factors, Severity)  Note limiting factors.   35-year-old male presenting with request for methadone dosing.  Patient states his last dose was yesterday and he went for his dosing today however did not show up before they closed.  He states that they are also closed tomorrow and is requesting today's dose and take-home dose.  Denies any physical symptoms.          Review of External Medical Records:     Nursing Notes were reviewed.    REVIEW OF SYSTEMS    (2-9 systems for level 4, 10 or more for level 5)     Review of Systems    Except as noted above the remainder of the review of systems was reviewed and negative.       PAST MEDICAL HISTORY     Past Medical History:   Diagnosis Date    Brain aneurysm 2017         SURGICAL HISTORY       Past Surgical History:   Procedure Laterality Date    OTHER SURGICAL HISTORY  2017    surgery for brain aneurysm         CURRENT MEDICATIONS       Previous Medications    ALBUTEROL SULFATE HFA (PROVENTIL;VENTOLIN;PROAIR) 108 (90 BASE) MCG/ACT INHALER    TAKE 1-2 PUFFS BY INHALATION EVERY FOUR (4) HOURS AS NEEDED FOR WHEEZING FOR UP TO 90 DAYS.    BUPROPION (WELLBUTRIN SR) 200 MG EXTENDED RELEASE TABLET    TAKE 1 TABLET BY MOUTH TWICE A DAY    FEXOFENADINE (ALLEGRA) 60 MG TABLET    Take 1 tablet by mouth daily    FLUTICASONE (FLONASE) 50 MCG/ACT NASAL SPRAY    SPRAY 1 SPRAY BY NASAL ROUTE EVERY DAY    IBUPROFEN (ADVIL;MOTRIN) 800 MG TABLET    TAKE 1 TABLET BY MOUTH EVERY 8 HOURS AS NEEDED FOR PAIN    METFORMIN (GLUCOPHAGE) 500 MG TABLET    TAKE 1 TABLET BY MOUTH EVERY DAY

## 2025-05-31 NOTE — ED TRIAGE NOTES
Pt arrived ambulatory to the ER with CC missed dose of methadone 130mg today at [facility]. [phone]

## 2025-07-20 ENCOUNTER — APPOINTMENT (OUTPATIENT)
Facility: HOSPITAL | Age: 35
End: 2025-07-20
Payer: COMMERCIAL

## 2025-07-20 ENCOUNTER — HOSPITAL ENCOUNTER (EMERGENCY)
Facility: HOSPITAL | Age: 35
Discharge: HOME | End: 2025-07-20
Attending: EMERGENCY MEDICINE
Payer: COMMERCIAL

## 2025-07-20 VITALS
DIASTOLIC BLOOD PRESSURE: 87 MMHG | RESPIRATION RATE: 16 BRPM | HEART RATE: 83 BPM | TEMPERATURE: 100 F | OXYGEN SATURATION: 99 % | HEIGHT: 72 IN | BODY MASS INDEX: 42.66 KG/M2 | WEIGHT: 315 LBS | SYSTOLIC BLOOD PRESSURE: 155 MMHG

## 2025-07-20 DIAGNOSIS — R10.84 GENERALIZED ABDOMINAL PAIN: Primary | ICD-10-CM

## 2025-07-20 LAB
ALBUMIN SERPL-MCNC: 3.7 G/DL (ref 3.5–5)
ALBUMIN/GLOB SERPL: 0.9 (ref 1.1–2.2)
ALP SERPL-CCNC: 108 U/L (ref 45–117)
ALT SERPL-CCNC: 48 U/L (ref 12–78)
ANION GAP SERPL CALC-SCNC: 5 MMOL/L (ref 2–12)
APPEARANCE UR: CLEAR
AST SERPL-CCNC: 44 U/L (ref 15–37)
BACTERIA URNS QL MICRO: NEGATIVE /HPF
BASOPHILS # BLD: 0.04 K/UL (ref 0–0.1)
BASOPHILS NFR BLD: 0.8 % (ref 0–1)
BILIRUB SERPL-MCNC: 0.4 MG/DL (ref 0.2–1)
BILIRUB UR QL: NEGATIVE
BUN SERPL-MCNC: 7 MG/DL (ref 6–20)
BUN/CREAT SERPL: 6 (ref 12–20)
CALCIUM SERPL-MCNC: 9 MG/DL (ref 8.5–10.1)
CHLORIDE SERPL-SCNC: 105 MMOL/L (ref 97–108)
CO2 SERPL-SCNC: 25 MMOL/L (ref 21–32)
COLOR UR: ABNORMAL
COMMENT:: NORMAL
CREAT SERPL-MCNC: 1.11 MG/DL (ref 0.7–1.3)
DIFFERENTIAL METHOD BLD: ABNORMAL
EOSINOPHIL # BLD: 0.03 K/UL (ref 0–0.4)
EOSINOPHIL NFR BLD: 0.6 % (ref 0–7)
EPITH CASTS URNS QL MICRO: ABNORMAL /LPF
ERYTHROCYTE [DISTWIDTH] IN BLOOD BY AUTOMATED COUNT: 12.1 % (ref 11.5–14.5)
GLOBULIN SER CALC-MCNC: 3.9 G/DL (ref 2–4)
GLUCOSE SERPL-MCNC: 115 MG/DL (ref 65–100)
GLUCOSE UR STRIP.AUTO-MCNC: NEGATIVE MG/DL
HCT VFR BLD AUTO: 42 % (ref 36.6–50.3)
HGB BLD-MCNC: 13.6 G/DL (ref 12.1–17)
HGB UR QL STRIP: NEGATIVE
IMM GRANULOCYTES # BLD AUTO: 0.03 K/UL (ref 0–0.04)
IMM GRANULOCYTES NFR BLD AUTO: 0.6 % (ref 0–0.5)
KETONES UR QL STRIP.AUTO: ABNORMAL MG/DL
LEUKOCYTE ESTERASE UR QL STRIP.AUTO: ABNORMAL
LIPASE SERPL-CCNC: 10 U/L (ref 13–75)
LYMPHOCYTES # BLD: 1.8 K/UL (ref 0.8–3.5)
LYMPHOCYTES NFR BLD: 34.5 % (ref 12–49)
MCH RBC QN AUTO: 27.5 PG (ref 26–34)
MCHC RBC AUTO-ENTMCNC: 32.4 G/DL (ref 30–36.5)
MCV RBC AUTO: 84.8 FL (ref 80–99)
MONOCYTES # BLD: 0.55 K/UL (ref 0–1)
MONOCYTES NFR BLD: 10.5 % (ref 5–13)
MUCOUS THREADS URNS QL MICRO: ABNORMAL /LPF
NEUTS SEG # BLD: 2.77 K/UL (ref 1.8–8)
NEUTS SEG NFR BLD: 53 % (ref 32–75)
NITRITE UR QL STRIP.AUTO: NEGATIVE
NRBC # BLD: 0 K/UL (ref 0–0.01)
NRBC BLD-RTO: 0 PER 100 WBC
PH UR STRIP: 5.5 (ref 5–8)
PLATELET # BLD AUTO: 219 K/UL (ref 150–400)
PMV BLD AUTO: 9.2 FL (ref 8.9–12.9)
POTASSIUM SERPL-SCNC: 3.7 MMOL/L (ref 3.5–5.1)
PROT SERPL-MCNC: 7.6 G/DL (ref 6.4–8.2)
PROT UR STRIP-MCNC: ABNORMAL MG/DL
RBC # BLD AUTO: 4.95 M/UL (ref 4.1–5.7)
RBC #/AREA URNS HPF: ABNORMAL /HPF (ref 0–5)
SODIUM SERPL-SCNC: 135 MMOL/L (ref 136–145)
SP GR UR REFRACTOMETRY: ABNORMAL (ref 1–1.03)
SPECIMEN HOLD: NORMAL
SPECIMEN HOLD: NORMAL
UROBILINOGEN UR QL STRIP.AUTO: 1 EU/DL (ref 0.2–1)
WBC # BLD AUTO: 5.2 K/UL (ref 4.1–11.1)
WBC URNS QL MICRO: ABNORMAL /HPF (ref 0–4)

## 2025-07-20 PROCEDURE — 83690 ASSAY OF LIPASE: CPT

## 2025-07-20 PROCEDURE — 85025 COMPLETE CBC W/AUTO DIFF WBC: CPT

## 2025-07-20 PROCEDURE — 99285 EMERGENCY DEPT VISIT HI MDM: CPT

## 2025-07-20 PROCEDURE — 74177 CT ABD & PELVIS W/CONTRAST: CPT

## 2025-07-20 PROCEDURE — 6360000004 HC RX CONTRAST MEDICATION

## 2025-07-20 PROCEDURE — 81001 URINALYSIS AUTO W/SCOPE: CPT

## 2025-07-20 PROCEDURE — 80053 COMPREHEN METABOLIC PANEL: CPT

## 2025-07-20 RX ORDER — IOPAMIDOL 755 MG/ML
100 INJECTION, SOLUTION INTRAVASCULAR
Status: COMPLETED | OUTPATIENT
Start: 2025-07-20 | End: 2025-07-20

## 2025-07-20 RX ADMIN — IOPAMIDOL 100 ML: 755 INJECTION, SOLUTION INTRAVENOUS at 18:46

## 2025-07-20 ASSESSMENT — PAIN DESCRIPTION - ORIENTATION: ORIENTATION: RIGHT;LEFT

## 2025-07-20 ASSESSMENT — PAIN DESCRIPTION - ONSET: ONSET: ON-GOING

## 2025-07-20 ASSESSMENT — PAIN - FUNCTIONAL ASSESSMENT
PAIN_FUNCTIONAL_ASSESSMENT: 0-10
PAIN_FUNCTIONAL_ASSESSMENT: ACTIVITIES ARE NOT PREVENTED

## 2025-07-20 ASSESSMENT — PAIN DESCRIPTION - DESCRIPTORS: DESCRIPTORS: ACHING

## 2025-07-20 ASSESSMENT — PAIN DESCRIPTION - FREQUENCY: FREQUENCY: CONTINUOUS

## 2025-07-20 ASSESSMENT — PAIN SCALES - GENERAL: PAINLEVEL_OUTOF10: 5

## 2025-07-20 ASSESSMENT — PAIN DESCRIPTION - LOCATION: LOCATION: GENERALIZED

## 2025-07-20 ASSESSMENT — PAIN DESCRIPTION - PAIN TYPE: TYPE: ACUTE PAIN

## 2025-07-20 NOTE — ED PROVIDER NOTES
[facility] EMERGENCY DEPARTMENT  EMERGENCY DEPARTMENT ENCOUNTER      Pt Name: Eloy Sanderson  MRN: [id number]  Birthdate 1990  Date of evaluation: 7/20/2025  Provider: Alejandro Shoemaker PA-C    CHIEF COMPLAINT       Chief Complaint   Patient presents with    Fever    Fatigue         HISTORY OF PRESENT ILLNESS   (Location/Symptom, Timing/Onset, Context/Setting, Quality, Duration, Modifying Factors, Severity)  Note limiting factors.   Eloy Sanderson is a 35 y.o. male who presents to the emergency department for subjective fever and body aches since earlier today.  He also reports suprapubic abdominal pain and lower back pain. Denies urinary symptoms. Denies n,v,d, CP or SOB.     The history is provided by the patient.         Review of External Medical Records:     Nursing Notes were reviewed.    REVIEW OF SYSTEMS    (2-9 systems for level 4, 10 or more for level 5)     Review of Systems    Except as noted above the remainder of the review of systems was reviewed and negative.       PAST MEDICAL HISTORY     Past Medical History:   Diagnosis Date    Brain aneurysm 2017         SURGICAL HISTORY       Past Surgical History:   Procedure Laterality Date    OTHER SURGICAL HISTORY  2017    surgery for brain aneurysm         CURRENT MEDICATIONS       Previous Medications    ALBUTEROL SULFATE HFA (PROVENTIL;VENTOLIN;PROAIR) 108 (90 BASE) MCG/ACT INHALER    TAKE 1-2 PUFFS BY INHALATION EVERY FOUR (4) HOURS AS NEEDED FOR WHEEZING FOR UP TO 90 DAYS.    BUPROPION (WELLBUTRIN SR) 200 MG EXTENDED RELEASE TABLET    TAKE 1 TABLET BY MOUTH TWICE A DAY    FEXOFENADINE (ALLEGRA) 60 MG TABLET    Take 1 tablet by mouth daily    FLUTICASONE (FLONASE) 50 MCG/ACT NASAL SPRAY    SPRAY 1 SPRAY BY NASAL ROUTE EVERY DAY    IBUPROFEN (ADVIL;MOTRIN) 800 MG TABLET    TAKE 1 TABLET BY MOUTH EVERY 8 HOURS AS NEEDED FOR PAIN    METFORMIN (GLUCOPHAGE) 500 MG TABLET    TAKE 1 TABLET BY MOUTH EVERY DAY    MONTELUKAST (SINGULAIR) 10 MG TABLET    Take

## 2025-07-20 NOTE — ED TRIAGE NOTES
Pt arrives ambulatory w/ cc of generalized weakness, pain, fever, lower abdominal, and back pain.    Pt was worried he had a kidney infection. He denies any difficulty peeing or abnormal urination.     Pt took 800mg  ibuprofen for temp of 102 before arrival.

## 2025-07-21 ENCOUNTER — OFFICE VISIT (OUTPATIENT)
Facility: CLINIC | Age: 35
End: 2025-07-21
Payer: COMMERCIAL

## 2025-07-21 VITALS
RESPIRATION RATE: 16 BRPM | BODY MASS INDEX: 41.09 KG/M2 | DIASTOLIC BLOOD PRESSURE: 79 MMHG | HEIGHT: 72 IN | WEIGHT: 303.4 LBS | HEART RATE: 86 BPM | TEMPERATURE: 100.1 F | SYSTOLIC BLOOD PRESSURE: 128 MMHG | OXYGEN SATURATION: 95 %

## 2025-07-21 DIAGNOSIS — K21.9 GASTROESOPHAGEAL REFLUX DISEASE WITHOUT ESOPHAGITIS: ICD-10-CM

## 2025-07-21 DIAGNOSIS — F41.9 ANXIETY: Primary | ICD-10-CM

## 2025-07-21 DIAGNOSIS — M25.562 CHRONIC PAIN OF LEFT KNEE: ICD-10-CM

## 2025-07-21 DIAGNOSIS — F33.2 SEVERE EPISODE OF RECURRENT MAJOR DEPRESSIVE DISORDER, WITHOUT PSYCHOTIC FEATURES (HCC): ICD-10-CM

## 2025-07-21 DIAGNOSIS — I67.1 CEREBRAL ANEURYSM: ICD-10-CM

## 2025-07-21 DIAGNOSIS — Z87.898 HISTORY OF INTRAVENOUS DRUG ABUSE: ICD-10-CM

## 2025-07-21 DIAGNOSIS — G89.29 CHRONIC PAIN OF LEFT KNEE: ICD-10-CM

## 2025-07-21 DIAGNOSIS — R20.2 PARESTHESIA OF UPPER AND LOWER EXTREMITIES OF BOTH SIDES: ICD-10-CM

## 2025-07-21 DIAGNOSIS — R50.9 FEVER, UNSPECIFIED FEVER CAUSE: ICD-10-CM

## 2025-07-21 DIAGNOSIS — Z72.0 TOBACCO USE: ICD-10-CM

## 2025-07-21 DIAGNOSIS — Z83.3 FAMILY HISTORY OF DIABETES MELLITUS: ICD-10-CM

## 2025-07-21 DIAGNOSIS — F11.20 UNCOMPLICATED OPIOID DEPENDENCE (HCC): ICD-10-CM

## 2025-07-21 DIAGNOSIS — R73.01 IFG (IMPAIRED FASTING GLUCOSE): ICD-10-CM

## 2025-07-21 PROCEDURE — 99395 PREV VISIT EST AGE 18-39: CPT | Performed by: PHYSICIAN ASSISTANT

## 2025-07-21 SDOH — ECONOMIC STABILITY: FOOD INSECURITY: WITHIN THE PAST 12 MONTHS, YOU WORRIED THAT YOUR FOOD WOULD RUN OUT BEFORE YOU GOT MONEY TO BUY MORE.: NEVER TRUE

## 2025-07-21 SDOH — ECONOMIC STABILITY: FOOD INSECURITY: WITHIN THE PAST 12 MONTHS, THE FOOD YOU BOUGHT JUST DIDN'T LAST AND YOU DIDN'T HAVE MONEY TO GET MORE.: NEVER TRUE

## 2025-07-21 ASSESSMENT — PATIENT HEALTH QUESTIONNAIRE - PHQ9
2. FEELING DOWN, DEPRESSED OR HOPELESS: NOT AT ALL
SUM OF ALL RESPONSES TO PHQ QUESTIONS 1-9: 0
6. FEELING BAD ABOUT YOURSELF - OR THAT YOU ARE A FAILURE OR HAVE LET YOURSELF OR YOUR FAMILY DOWN: NOT AT ALL
8. MOVING OR SPEAKING SO SLOWLY THAT OTHER PEOPLE COULD HAVE NOTICED. OR THE OPPOSITE, BEING SO FIGETY OR RESTLESS THAT YOU HAVE BEEN MOVING AROUND A LOT MORE THAN USUAL: NOT AT ALL
4. FEELING TIRED OR HAVING LITTLE ENERGY: NOT AT ALL
SUM OF ALL RESPONSES TO PHQ QUESTIONS 1-9: 0
10. IF YOU CHECKED OFF ANY PROBLEMS, HOW DIFFICULT HAVE THESE PROBLEMS MADE IT FOR YOU TO DO YOUR WORK, TAKE CARE OF THINGS AT HOME, OR GET ALONG WITH OTHER PEOPLE: NOT DIFFICULT AT ALL
5. POOR APPETITE OR OVEREATING: NOT AT ALL
7. TROUBLE CONCENTRATING ON THINGS, SUCH AS READING THE NEWSPAPER OR WATCHING TELEVISION: NOT AT ALL
SUM OF ALL RESPONSES TO PHQ QUESTIONS 1-9: 0
9. THOUGHTS THAT YOU WOULD BE BETTER OFF DEAD, OR OF HURTING YOURSELF: NOT AT ALL
3. TROUBLE FALLING OR STAYING ASLEEP: NOT AT ALL
1. LITTLE INTEREST OR PLEASURE IN DOING THINGS: NOT AT ALL
SUM OF ALL RESPONSES TO PHQ QUESTIONS 1-9: 0

## 2025-07-21 ASSESSMENT — ENCOUNTER SYMPTOMS
BLOOD IN STOOL: 0
VOMITING: 0
NAUSEA: 0
ABDOMINAL PAIN: 0
CONSTIPATION: 0
SHORTNESS OF BREATH: 0
DIARRHEA: 0

## 2025-07-21 NOTE — PROGRESS NOTES
Eloy Sanderson  Identified pt with two pt identifiers(name and ).     Chief Complaint   Patient presents with    Annual Exam     Room 4A //        Reviewed record In preparation for visit and have obtained necessary documentation.     1. Have you been to the ER, urgent care clinic or hospitalized since your last visit? No     2. Have you seen or consulted any other health care providers outside of the [facility] since your last visit? Include any pap smears or colon screening. No    Patient does not have an advance directive.     Vitals reviewed with provider.    Health Maintenance reviewed:     Health Maintenance Due   Topic    Depression Monitoring     HIV screen     DTaP/Tdap/Td vaccine (1 - Tdap)    Pneumococcal 0-49 years Vaccine (1 of 2 - PCV)    A1C test (Diabetic or Prediabetic)           Wt Readings from Last 3 Encounters:   25 (!) 137.6 kg (303 lb 6.4 oz)   25 (!) 147.6 kg (325 lb 6.4 oz)   25 135 kg (297 lb 9.9 oz)        Temp Readings from Last 3 Encounters:   25 100 °F (37.8 °C) (Oral)   25 98.2 °F (36.8 °C) (Oral)        BP Readings from Last 3 Encounters:   25 (!) 155/87   25 130/77   23 134/84        Pulse Readings from Last 3 Encounters:   25 83   25 71   23 67             No data to display                  Learning Assessment:         2023    11:00 AM 2022    12:00 AM   [facility] AMB LEARNING ASSESSMENT   Primary Learner Patient Patient   level of education  GRADUATED HIGH SCHOOL OR GED   Barriers Factors  NONE   Primary Language ENGLISH ENGLISH   Learning Preference DEMONSTRATION DEMONSTRATION   Answered By Patient Patient   Relationship to Learner SELF SELF         Fall Risk Assessment:   :         2022    10:38 AM   Amb Fall Risk Assessment and TUG Test   Total Score 1       Abuse Screening:   :          No data to display                   ADL Screening:   :         2023     9:00 AM   ADL 
Worried About Running Out of Food in the Last Year: Never true     Ran Out of Food in the Last Year: Never true   Transportation Needs: No Transportation Needs (7/21/2025)    PRAPARE - Transportation     Lack of Transportation (Medical): No     Lack of Transportation (Non-Medical): No   Physical Activity: Not on file   Stress: Not on file   Social Connections: Not on file   Intimate Partner Violence: Not on file   Housing Stability: Low Risk  (7/21/2025)    Housing Stability Vital Sign     Unable to Pay for Housing in the Last Year: No     Number of Times Moved in the Last Year: 0     Homeless in the Last Year: No           /79 (BP Site: Left Upper Arm, Patient Position: Sitting)   Pulse 86   Temp 100.1 °F (37.8 °C) (Oral)   Resp 16   Ht 1.829 m (6')   Wt (!) 137.6 kg (303 lb 6.4 oz)   SpO2 95%   BMI 41.15 kg/m²     Review of Systems   Constitutional:  Positive for fever. Negative for chills.   Respiratory:  Negative for shortness of breath.    Cardiovascular:  Negative for chest pain, palpitations and leg swelling.   Gastrointestinal:  Negative for abdominal pain, blood in stool, constipation, diarrhea, nausea and vomiting.   Genitourinary:  Negative for dysuria and hematuria.   Musculoskeletal:  Positive for arthralgias (L knee) and myalgias.   Skin:  Negative for rash.   Neurological:  Positive for headaches.        Physical Exam  Vitals and nursing note reviewed.   Constitutional:       General: He is not in acute distress.     Appearance: Normal appearance. He is morbidly obese.   HENT:      Right Ear: External ear normal.      Left Ear: External ear normal.      Nose: Nose normal.      Mouth/Throat:      Mouth: Mucous membranes are moist.   Eyes:      Conjunctiva/sclera: Conjunctivae normal.      Pupils: Pupils are equal, round, and reactive to light.   Neck:      Vascular: No carotid bruit.   Cardiovascular:      Rate and Rhythm: Normal rate and regular rhythm.      Pulses: Normal pulses.

## 2025-07-22 LAB
ALBUMIN SERPL-MCNC: 4.2 G/DL (ref 4.1–5.1)
ALP SERPL-CCNC: 130 IU/L (ref 44–121)
ALT SERPL-CCNC: 60 IU/L (ref 0–44)
APPEARANCE UR: CLEAR
AST SERPL-CCNC: 62 IU/L (ref 0–40)
BACTERIA #/AREA URNS HPF: ABNORMAL /[HPF]
BASOPHILS # BLD AUTO: 0.1 X10E3/UL (ref 0–0.2)
BASOPHILS NFR BLD AUTO: 1 %
BILIRUB SERPL-MCNC: 0.4 MG/DL (ref 0–1.2)
BILIRUB UR QL STRIP: ABNORMAL
BUN SERPL-MCNC: 7 MG/DL (ref 6–20)
BUN/CREAT SERPL: 7 (ref 9–20)
CALCIUM SERPL-MCNC: 9 MG/DL (ref 8.7–10.2)
CASTS URNS QL MICRO: ABNORMAL /LPF
CHLORIDE SERPL-SCNC: 101 MMOL/L (ref 96–106)
CO2 SERPL-SCNC: 22 MMOL/L (ref 20–29)
COLOR UR: YELLOW
CREAT SERPL-MCNC: 0.94 MG/DL (ref 0.76–1.27)
EGFRCR SERPLBLD CKD-EPI 2021: 107 ML/MIN/1.73
EOSINOPHIL # BLD AUTO: 0 X10E3/UL (ref 0–0.4)
EOSINOPHIL NFR BLD AUTO: 0 %
EPI CELLS #/AREA URNS HPF: ABNORMAL /HPF (ref 0–10)
ERYTHROCYTE [DISTWIDTH] IN BLOOD BY AUTOMATED COUNT: 11.9 % (ref 11.6–15.4)
EST. AVERAGE GLUCOSE BLD GHB EST-MCNC: 105 MG/DL
GLOBULIN SER CALC-MCNC: 2.7 G/DL (ref 1.5–4.5)
GLUCOSE SERPL-MCNC: 99 MG/DL (ref 70–99)
GLUCOSE UR QL STRIP: NEGATIVE
HAV IGM SERPL QL IA: NEGATIVE
HBA1C MFR BLD: 5.3 % (ref 4.8–5.6)
HBV CORE IGM SERPL QL IA: NEGATIVE
HBV SURFACE AG SERPL QL IA: NEGATIVE
HCT VFR BLD AUTO: 39.1 % (ref 37.5–51)
HCV AB SERPL QL IA: NON REACTIVE
HCV AB SERPL QL IA: NORMAL
HGB BLD-MCNC: 12.5 G/DL (ref 13–17.7)
HGB UR QL STRIP: NEGATIVE
HIV 1+2 AB+HIV1 P24 AG SERPL QL IA: NON REACTIVE
IMM GRANULOCYTES # BLD AUTO: 0 X10E3/UL (ref 0–0.1)
IMM GRANULOCYTES NFR BLD AUTO: 0 %
KETONES UR QL STRIP: ABNORMAL
LEUKOCYTE ESTERASE UR QL STRIP: ABNORMAL
LYMPHOCYTES # BLD AUTO: 1.6 X10E3/UL (ref 0.7–3.1)
LYMPHOCYTES NFR BLD AUTO: 33 %
MCH RBC QN AUTO: 27.4 PG (ref 26.6–33)
MCHC RBC AUTO-ENTMCNC: 32 G/DL (ref 31.5–35.7)
MCV RBC AUTO: 86 FL (ref 79–97)
MICRO URNS: ABNORMAL
MONOCYTES # BLD AUTO: 0.5 X10E3/UL (ref 0.1–0.9)
MONOCYTES NFR BLD AUTO: 10 %
NEUTROPHILS # BLD AUTO: 2.6 X10E3/UL (ref 1.4–7)
NEUTROPHILS NFR BLD AUTO: 56 %
NITRITE UR QL STRIP: NEGATIVE
PH UR STRIP: 6 [PH] (ref 5–7.5)
PLATELET # BLD AUTO: 227 X10E3/UL (ref 150–450)
POTASSIUM SERPL-SCNC: 4.2 MMOL/L (ref 3.5–5.2)
PROT SERPL-MCNC: 6.9 G/DL (ref 6–8.5)
PROT UR QL STRIP: NEGATIVE
PSA SERPL-MCNC: 0.3 NG/ML (ref 0–4)
RBC # BLD AUTO: 4.57 X10E6/UL (ref 4.14–5.8)
RBC #/AREA URNS HPF: ABNORMAL /HPF (ref 0–2)
SODIUM SERPL-SCNC: 138 MMOL/L (ref 134–144)
SP GR UR STRIP: 1.03 (ref 1–1.03)
TSH SERPL DL<=0.005 MIU/L-ACNC: 2.74 UIU/ML (ref 0.45–4.5)
UROBILINOGEN UR STRIP-MCNC: 4 MG/DL (ref 0.2–1)
VIT B12 SERPL-MCNC: 389 PG/ML (ref 232–1245)
WBC # BLD AUTO: 4.7 X10E3/UL (ref 3.4–10.8)
WBC #/AREA URNS HPF: ABNORMAL /HPF (ref 0–5)